# Patient Record
Sex: MALE | Race: WHITE | NOT HISPANIC OR LATINO | URBAN - METROPOLITAN AREA
[De-identification: names, ages, dates, MRNs, and addresses within clinical notes are randomized per-mention and may not be internally consistent; named-entity substitution may affect disease eponyms.]

---

## 2017-12-25 VITALS
HEART RATE: 110 BPM | SYSTOLIC BLOOD PRESSURE: 144 MMHG | RESPIRATION RATE: 20 BRPM | TEMPERATURE: 98 F | OXYGEN SATURATION: 100 % | DIASTOLIC BLOOD PRESSURE: 87 MMHG

## 2017-12-25 LAB
ALBUMIN SERPL ELPH-MCNC: 5 G/DL — SIGNIFICANT CHANGE UP (ref 3.3–5)
ALP SERPL-CCNC: 75 U/L — SIGNIFICANT CHANGE UP (ref 40–120)
ALT FLD-CCNC: 20 U/L — SIGNIFICANT CHANGE UP (ref 10–45)
ANION GAP SERPL CALC-SCNC: 23 MMOL/L — HIGH (ref 5–17)
APTT BLD: 27.3 SEC — LOW (ref 27.5–37.4)
AST SERPL-CCNC: 27 U/L — SIGNIFICANT CHANGE UP (ref 10–40)
B-OH-BUTYR SERPL-SCNC: 1.9 MMOL/L — HIGH
BASE EXCESS BLDV CALC-SCNC: -0.8 MMOL/L — SIGNIFICANT CHANGE UP
BASOPHILS NFR BLD AUTO: 0.1 % — SIGNIFICANT CHANGE UP (ref 0–2)
BILIRUB SERPL-MCNC: 0.8 MG/DL — SIGNIFICANT CHANGE UP (ref 0.2–1.2)
BUN SERPL-MCNC: 11 MG/DL — SIGNIFICANT CHANGE UP (ref 7–23)
CALCIUM SERPL-MCNC: 9.6 MG/DL — SIGNIFICANT CHANGE UP (ref 8.4–10.5)
CHLORIDE SERPL-SCNC: 96 MMOL/L — SIGNIFICANT CHANGE UP (ref 96–108)
CO2 SERPL-SCNC: 19 MMOL/L — LOW (ref 22–31)
CREAT SERPL-MCNC: 1.02 MG/DL — SIGNIFICANT CHANGE UP (ref 0.5–1.3)
EOSINOPHIL NFR BLD AUTO: 0.2 % — SIGNIFICANT CHANGE UP (ref 0–6)
GAS PNL BLDV: SIGNIFICANT CHANGE UP
GLUCOSE SERPL-MCNC: 127 MG/DL — HIGH (ref 70–99)
HCO3 BLDV-SCNC: 24 MMOL/L — SIGNIFICANT CHANGE UP (ref 20–27)
HCT VFR BLD CALC: 44.1 % — SIGNIFICANT CHANGE UP (ref 39–50)
HGB BLD-MCNC: 15.6 G/DL — SIGNIFICANT CHANGE UP (ref 13–17)
INR BLD: 1.03 — SIGNIFICANT CHANGE UP (ref 0.88–1.16)
LACTATE SERPL-SCNC: 1 MMOL/L — SIGNIFICANT CHANGE UP (ref 0.5–2)
LIDOCAIN IGE QN: 14 U/L — SIGNIFICANT CHANGE UP (ref 7–60)
LYMPHOCYTES # BLD AUTO: 10.8 % — LOW (ref 13–44)
MCHC RBC-ENTMCNC: 29.8 PG — SIGNIFICANT CHANGE UP (ref 27–34)
MCHC RBC-ENTMCNC: 35.4 G/DL — SIGNIFICANT CHANGE UP (ref 32–36)
MCV RBC AUTO: 84.3 FL — SIGNIFICANT CHANGE UP (ref 80–100)
MONOCYTES NFR BLD AUTO: 7.3 % — SIGNIFICANT CHANGE UP (ref 2–14)
NEUTROPHILS NFR BLD AUTO: 81.6 % — HIGH (ref 43–77)
PCO2 BLDV: 40 MMHG — LOW (ref 41–51)
PH BLDV: 7.4 — SIGNIFICANT CHANGE UP (ref 7.32–7.43)
PLATELET # BLD AUTO: 219 K/UL — SIGNIFICANT CHANGE UP (ref 150–400)
PO2 BLDV: 45 MMHG — SIGNIFICANT CHANGE UP
POTASSIUM SERPL-MCNC: 3.5 MMOL/L — SIGNIFICANT CHANGE UP (ref 3.5–5.3)
POTASSIUM SERPL-SCNC: 3.5 MMOL/L — SIGNIFICANT CHANGE UP (ref 3.5–5.3)
PROT SERPL-MCNC: 8.4 G/DL — HIGH (ref 6–8.3)
PROTHROM AB SERPL-ACNC: 11.4 SEC — SIGNIFICANT CHANGE UP (ref 9.8–12.7)
RBC # BLD: 5.23 M/UL — SIGNIFICANT CHANGE UP (ref 4.2–5.8)
RBC # FLD: 11.4 % — SIGNIFICANT CHANGE UP (ref 10.3–16.9)
SAO2 % BLDV: 78 % — SIGNIFICANT CHANGE UP
SODIUM SERPL-SCNC: 138 MMOL/L — SIGNIFICANT CHANGE UP (ref 135–145)
WBC # BLD: 15.3 K/UL — HIGH (ref 3.8–10.5)
WBC # FLD AUTO: 15.3 K/UL — HIGH (ref 3.8–10.5)

## 2017-12-25 PROCEDURE — 93010 ELECTROCARDIOGRAM REPORT: CPT

## 2017-12-25 PROCEDURE — 99284 EMERGENCY DEPT VISIT MOD MDM: CPT | Mod: 25

## 2017-12-25 RX ORDER — DIPHENHYDRAMINE HCL 50 MG
25 CAPSULE ORAL ONCE
Qty: 0 | Refills: 0 | Status: COMPLETED | OUTPATIENT
Start: 2017-12-25 | End: 2017-12-25

## 2017-12-25 RX ORDER — SODIUM CHLORIDE 9 MG/ML
1000 INJECTION INTRAMUSCULAR; INTRAVENOUS; SUBCUTANEOUS ONCE
Qty: 0 | Refills: 0 | Status: COMPLETED | OUTPATIENT
Start: 2017-12-25 | End: 2017-12-25

## 2017-12-25 RX ORDER — SODIUM CHLORIDE 9 MG/ML
2000 INJECTION INTRAMUSCULAR; INTRAVENOUS; SUBCUTANEOUS ONCE
Qty: 0 | Refills: 0 | Status: COMPLETED | OUTPATIENT
Start: 2017-12-25 | End: 2017-12-25

## 2017-12-25 RX ORDER — ONDANSETRON 8 MG/1
4 TABLET, FILM COATED ORAL ONCE
Qty: 0 | Refills: 0 | Status: COMPLETED | OUTPATIENT
Start: 2017-12-25 | End: 2017-12-25

## 2017-12-25 RX ORDER — SODIUM CHLORIDE 9 MG/ML
1000 INJECTION, SOLUTION INTRAVENOUS
Qty: 0 | Refills: 0 | Status: DISCONTINUED | OUTPATIENT
Start: 2017-12-25 | End: 2017-12-26

## 2017-12-25 RX ORDER — FAMOTIDINE 10 MG/ML
20 INJECTION INTRAVENOUS ONCE
Qty: 0 | Refills: 0 | Status: COMPLETED | OUTPATIENT
Start: 2017-12-25 | End: 2017-12-25

## 2017-12-25 RX ORDER — METOCLOPRAMIDE HCL 10 MG
10 TABLET ORAL ONCE
Qty: 0 | Refills: 0 | Status: COMPLETED | OUTPATIENT
Start: 2017-12-25 | End: 2017-12-25

## 2017-12-25 RX ORDER — SODIUM CHLORIDE 9 MG/ML
3 INJECTION INTRAMUSCULAR; INTRAVENOUS; SUBCUTANEOUS ONCE
Qty: 0 | Refills: 0 | Status: COMPLETED | OUTPATIENT
Start: 2017-12-25 | End: 2017-12-25

## 2017-12-25 RX ADMIN — Medication 104 MILLIGRAM(S): at 20:50

## 2017-12-25 RX ADMIN — ONDANSETRON 4 MILLIGRAM(S): 8 TABLET, FILM COATED ORAL at 20:50

## 2017-12-25 RX ADMIN — SODIUM CHLORIDE 3 MILLILITER(S): 9 INJECTION INTRAMUSCULAR; INTRAVENOUS; SUBCUTANEOUS at 20:40

## 2017-12-25 RX ADMIN — Medication 1 MILLIGRAM(S): at 21:27

## 2017-12-25 RX ADMIN — SODIUM CHLORIDE 2000 MILLILITER(S): 9 INJECTION INTRAMUSCULAR; INTRAVENOUS; SUBCUTANEOUS at 22:13

## 2017-12-25 RX ADMIN — Medication 25 MILLIGRAM(S): at 20:55

## 2017-12-25 RX ADMIN — SODIUM CHLORIDE 100 MILLILITER(S): 9 INJECTION, SOLUTION INTRAVENOUS at 23:04

## 2017-12-25 RX ADMIN — SODIUM CHLORIDE 1000 MILLILITER(S): 9 INJECTION INTRAMUSCULAR; INTRAVENOUS; SUBCUTANEOUS at 20:50

## 2017-12-25 RX ADMIN — FAMOTIDINE 20 MILLIGRAM(S): 10 INJECTION INTRAVENOUS at 20:50

## 2017-12-25 RX ADMIN — ONDANSETRON 4 MILLIGRAM(S): 8 TABLET, FILM COATED ORAL at 21:27

## 2017-12-25 NOTE — ED PROVIDER NOTE - PROGRESS NOTE DETAILS
Pt requiring IV reglan in addition to Zofran due to continuous retching, vomiting. Pt requiring IV Ativan as well for nausea, continued retching. pt's labs notable for elevated anion gap, low CO2. will hydrate and repeat bmp Pt states he feels better, will repeat bmp and attempt po chall pt with persistent nausea and pain will obtain US of RUQ and also give zofran. pt's us neg acute, plan for xr although doubt sbo with epigastric pain only and pt with + bm, passing gas + loose stool only 1 surg.

## 2017-12-25 NOTE — ED ADULT NURSE NOTE - OBJECTIVE STATEMENT
pt with vomiting for 5 days, abdominal discomfort, unable to drink or eat, had same episode 2 years ago

## 2017-12-25 NOTE — ED PROVIDER NOTE - OBJECTIVE STATEMENT
27M from [place] with hx of cyclic vomiting presenting with weakness nausea for two weeks, multiple episodes of nbnb vomiting today (more than 10 as per patient), pt also with multiple episodes of nonbloody, no black diarrhea. No recent travel other than from [place], no recent abx use. Pt has hx of appendectomy. Hx of multiple w/u with endoscopy, colonoscopy, ultrasound w/o etiology. Pt with epigastric pain.

## 2017-12-25 NOTE — ED PROVIDER NOTE - MEDICAL DECISION MAKING DETAILS
27M with hx of nausea, vomiting, diarrhea. 2 weeks of intermittent symptoms, now worsening, epigastric pain only. No fever. Plan for Labs, IV fluids, reglan/zofran and reassess. Given epigastric pain will check liver labs and lipase. NO indication for acute imaging at this time doubt cholecystitis given that pt has had similar complaint before with resolution of sx. Will continue serial abd exam.

## 2017-12-26 ENCOUNTER — INPATIENT (INPATIENT)
Facility: HOSPITAL | Age: 27
LOS: 4 days | Discharge: HOME | DRG: 897 | End: 2017-12-31
Attending: STUDENT IN AN ORGANIZED HEALTH CARE EDUCATION/TRAINING PROGRAM | Admitting: STUDENT IN AN ORGANIZED HEALTH CARE EDUCATION/TRAINING PROGRAM
Payer: COMMERCIAL

## 2017-12-26 DIAGNOSIS — Z29.9 ENCOUNTER FOR PROPHYLACTIC MEASURES, UNSPECIFIED: ICD-10-CM

## 2017-12-26 DIAGNOSIS — Z90.49 ACQUIRED ABSENCE OF OTHER SPECIFIED PARTS OF DIGESTIVE TRACT: Chronic | ICD-10-CM

## 2017-12-26 DIAGNOSIS — E87.2 ACIDOSIS: ICD-10-CM

## 2017-12-26 DIAGNOSIS — R19.7 DIARRHEA, UNSPECIFIED: ICD-10-CM

## 2017-12-26 DIAGNOSIS — D72.829 ELEVATED WHITE BLOOD CELL COUNT, UNSPECIFIED: ICD-10-CM

## 2017-12-26 DIAGNOSIS — R11.10 VOMITING, UNSPECIFIED: ICD-10-CM

## 2017-12-26 LAB
ANION GAP SERPL CALC-SCNC: 13 MMOL/L — SIGNIFICANT CHANGE UP (ref 5–17)
ANION GAP SERPL CALC-SCNC: 14 MMOL/L — SIGNIFICANT CHANGE UP (ref 5–17)
ANION GAP SERPL CALC-SCNC: 16 MMOL/L — SIGNIFICANT CHANGE UP (ref 5–17)
APPEARANCE UR: CLEAR — SIGNIFICANT CHANGE UP
B-OH-BUTYR SERPL-SCNC: 1.9 MMOL/L — HIGH
BILIRUB UR-MCNC: NEGATIVE — SIGNIFICANT CHANGE UP
BUN SERPL-MCNC: 4 MG/DL — LOW (ref 7–23)
BUN SERPL-MCNC: 5 MG/DL — LOW (ref 7–23)
BUN SERPL-MCNC: 9 MG/DL — SIGNIFICANT CHANGE UP (ref 7–23)
CALCIUM SERPL-MCNC: 8.1 MG/DL — LOW (ref 8.4–10.5)
CALCIUM SERPL-MCNC: 8.6 MG/DL — SIGNIFICANT CHANGE UP (ref 8.4–10.5)
CALCIUM SERPL-MCNC: 9.2 MG/DL — SIGNIFICANT CHANGE UP (ref 8.4–10.5)
CHLORIDE SERPL-SCNC: 100 MMOL/L — SIGNIFICANT CHANGE UP (ref 96–108)
CHLORIDE SERPL-SCNC: 103 MMOL/L — SIGNIFICANT CHANGE UP (ref 96–108)
CHLORIDE SERPL-SCNC: 99 MMOL/L — SIGNIFICANT CHANGE UP (ref 96–108)
CO2 SERPL-SCNC: 22 MMOL/L — SIGNIFICANT CHANGE UP (ref 22–31)
CO2 SERPL-SCNC: 23 MMOL/L — SIGNIFICANT CHANGE UP (ref 22–31)
CO2 SERPL-SCNC: 23 MMOL/L — SIGNIFICANT CHANGE UP (ref 22–31)
COLOR SPEC: YELLOW — SIGNIFICANT CHANGE UP
CREAT SERPL-MCNC: 0.82 MG/DL — SIGNIFICANT CHANGE UP (ref 0.5–1.3)
CREAT SERPL-MCNC: 0.88 MG/DL — SIGNIFICANT CHANGE UP (ref 0.5–1.3)
CREAT SERPL-MCNC: 0.93 MG/DL — SIGNIFICANT CHANGE UP (ref 0.5–1.3)
DIFF PNL FLD: (no result)
GLUCOSE SERPL-MCNC: 108 MG/DL — HIGH (ref 70–99)
GLUCOSE SERPL-MCNC: 126 MG/DL — HIGH (ref 70–99)
GLUCOSE SERPL-MCNC: 227 MG/DL — HIGH (ref 70–99)
GLUCOSE UR QL: NEGATIVE — SIGNIFICANT CHANGE UP
HBA1C BLD-MCNC: 5.3 % — SIGNIFICANT CHANGE UP (ref 4–5.6)
HCT VFR BLD CALC: 40.6 % — SIGNIFICANT CHANGE UP (ref 39–50)
HGB BLD-MCNC: 14.2 G/DL — SIGNIFICANT CHANGE UP (ref 13–17)
KETONES UR-MCNC: >=80 MG/DL
LEUKOCYTE ESTERASE UR-ACNC: NEGATIVE — SIGNIFICANT CHANGE UP
MCHC RBC-ENTMCNC: 29.8 PG — SIGNIFICANT CHANGE UP (ref 27–34)
MCHC RBC-ENTMCNC: 35 G/DL — SIGNIFICANT CHANGE UP (ref 32–36)
MCV RBC AUTO: 85.1 FL — SIGNIFICANT CHANGE UP (ref 80–100)
NITRITE UR-MCNC: NEGATIVE — SIGNIFICANT CHANGE UP
OSMOLALITY SERPL: 294 MOSM/KG — SIGNIFICANT CHANGE UP (ref 280–301)
PH UR: 6 — SIGNIFICANT CHANGE UP (ref 5–8)
PLATELET # BLD AUTO: 174 K/UL — SIGNIFICANT CHANGE UP (ref 150–400)
POTASSIUM SERPL-MCNC: 3.4 MMOL/L — LOW (ref 3.5–5.3)
POTASSIUM SERPL-MCNC: 3.8 MMOL/L — SIGNIFICANT CHANGE UP (ref 3.5–5.3)
POTASSIUM SERPL-MCNC: 4 MMOL/L — SIGNIFICANT CHANGE UP (ref 3.5–5.3)
POTASSIUM SERPL-SCNC: 3.4 MMOL/L — LOW (ref 3.5–5.3)
POTASSIUM SERPL-SCNC: 3.8 MMOL/L — SIGNIFICANT CHANGE UP (ref 3.5–5.3)
POTASSIUM SERPL-SCNC: 4 MMOL/L — SIGNIFICANT CHANGE UP (ref 3.5–5.3)
PROT UR-MCNC: NEGATIVE MG/DL — SIGNIFICANT CHANGE UP
RBC # BLD: 4.77 M/UL — SIGNIFICANT CHANGE UP (ref 4.2–5.8)
RBC # FLD: 11.7 % — SIGNIFICANT CHANGE UP (ref 10.3–16.9)
SODIUM SERPL-SCNC: 136 MMOL/L — SIGNIFICANT CHANGE UP (ref 135–145)
SODIUM SERPL-SCNC: 138 MMOL/L — SIGNIFICANT CHANGE UP (ref 135–145)
SODIUM SERPL-SCNC: 139 MMOL/L — SIGNIFICANT CHANGE UP (ref 135–145)
SP GR SPEC: 1.02 — SIGNIFICANT CHANGE UP (ref 1–1.03)
UROBILINOGEN FLD QL: 0.2 E.U./DL — SIGNIFICANT CHANGE UP
WBC # BLD: 10.3 K/UL — SIGNIFICANT CHANGE UP (ref 3.8–10.5)
WBC # FLD AUTO: 10.3 K/UL — SIGNIFICANT CHANGE UP (ref 3.8–10.5)

## 2017-12-26 PROCEDURE — 71020: CPT | Mod: 26

## 2017-12-26 PROCEDURE — 93010 ELECTROCARDIOGRAM REPORT: CPT | Mod: 77

## 2017-12-26 PROCEDURE — 74020: CPT | Mod: 26

## 2017-12-26 PROCEDURE — 99223 1ST HOSP IP/OBS HIGH 75: CPT | Mod: GC

## 2017-12-26 PROCEDURE — 12345: CPT | Mod: GC,NC

## 2017-12-26 PROCEDURE — 76705 ECHO EXAM OF ABDOMEN: CPT | Mod: 26

## 2017-12-26 PROCEDURE — 93010 ELECTROCARDIOGRAM REPORT: CPT

## 2017-12-26 RX ORDER — SODIUM CHLORIDE 9 MG/ML
1000 INJECTION INTRAMUSCULAR; INTRAVENOUS; SUBCUTANEOUS
Qty: 0 | Refills: 0 | Status: DISCONTINUED | OUTPATIENT
Start: 2017-12-26 | End: 2017-12-26

## 2017-12-26 RX ORDER — MORPHINE SULFATE 50 MG/1
2 CAPSULE, EXTENDED RELEASE ORAL ONCE
Qty: 0 | Refills: 0 | Status: DISCONTINUED | OUTPATIENT
Start: 2017-12-26 | End: 2017-12-26

## 2017-12-26 RX ORDER — ONDANSETRON 8 MG/1
4 TABLET, FILM COATED ORAL EVERY 6 HOURS
Qty: 0 | Refills: 0 | Status: COMPLETED | OUTPATIENT
Start: 2017-12-26 | End: 2017-12-26

## 2017-12-26 RX ORDER — POTASSIUM CHLORIDE 20 MEQ
40 PACKET (EA) ORAL ONCE
Qty: 0 | Refills: 0 | Status: COMPLETED | OUTPATIENT
Start: 2017-12-26 | End: 2017-12-26

## 2017-12-26 RX ORDER — METOCLOPRAMIDE HCL 10 MG
10 TABLET ORAL ONCE
Qty: 0 | Refills: 0 | Status: COMPLETED | OUTPATIENT
Start: 2017-12-26 | End: 2017-12-26

## 2017-12-26 RX ORDER — ONDANSETRON 8 MG/1
4 TABLET, FILM COATED ORAL EVERY 4 HOURS
Qty: 0 | Refills: 0 | Status: DISCONTINUED | OUTPATIENT
Start: 2017-12-26 | End: 2017-12-26

## 2017-12-26 RX ORDER — POTASSIUM CHLORIDE 20 MEQ
20 PACKET (EA) ORAL ONCE
Qty: 0 | Refills: 0 | Status: DISCONTINUED | OUTPATIENT
Start: 2017-12-26 | End: 2017-12-26

## 2017-12-26 RX ORDER — ONDANSETRON 8 MG/1
4 TABLET, FILM COATED ORAL ONCE
Qty: 0 | Refills: 0 | Status: COMPLETED | OUTPATIENT
Start: 2017-12-26 | End: 2017-12-26

## 2017-12-26 RX ORDER — ACETAMINOPHEN 500 MG
1000 TABLET ORAL ONCE
Qty: 0 | Refills: 0 | Status: COMPLETED | OUTPATIENT
Start: 2017-12-26 | End: 2017-12-26

## 2017-12-26 RX ORDER — FAMOTIDINE 10 MG/ML
20 INJECTION INTRAVENOUS DAILY
Qty: 0 | Refills: 0 | Status: DISCONTINUED | OUTPATIENT
Start: 2017-12-26 | End: 2017-12-26

## 2017-12-26 RX ORDER — SODIUM CHLORIDE 9 MG/ML
1000 INJECTION, SOLUTION INTRAVENOUS ONCE
Qty: 0 | Refills: 0 | Status: COMPLETED | OUTPATIENT
Start: 2017-12-26 | End: 2017-12-26

## 2017-12-26 RX ORDER — LANOLIN ALCOHOL/MO/W.PET/CERES
3 CREAM (GRAM) TOPICAL AT BEDTIME
Qty: 0 | Refills: 0 | Status: DISCONTINUED | OUTPATIENT
Start: 2017-12-26 | End: 2017-12-27

## 2017-12-26 RX ORDER — FINASTERIDE 5 MG/1
0 TABLET, FILM COATED ORAL
Qty: 0 | Refills: 0 | COMMUNITY

## 2017-12-26 RX ORDER — POTASSIUM CHLORIDE 20 MEQ
20 PACKET (EA) ORAL ONCE
Qty: 0 | Refills: 0 | Status: COMPLETED | OUTPATIENT
Start: 2017-12-26 | End: 2017-12-26

## 2017-12-26 RX ORDER — PANTOPRAZOLE SODIUM 20 MG/1
40 TABLET, DELAYED RELEASE ORAL DAILY
Qty: 0 | Refills: 0 | Status: DISCONTINUED | OUTPATIENT
Start: 2017-12-26 | End: 2017-12-31

## 2017-12-26 RX ORDER — METOCLOPRAMIDE HCL 10 MG
10 TABLET ORAL EVERY 8 HOURS
Qty: 0 | Refills: 0 | Status: DISCONTINUED | OUTPATIENT
Start: 2017-12-26 | End: 2017-12-28

## 2017-12-26 RX ORDER — SODIUM CHLORIDE 9 MG/ML
1000 INJECTION, SOLUTION INTRAVENOUS
Qty: 0 | Refills: 0 | Status: DISCONTINUED | OUTPATIENT
Start: 2017-12-26 | End: 2017-12-31

## 2017-12-26 RX ORDER — METOCLOPRAMIDE HCL 10 MG
10 TABLET ORAL EVERY 8 HOURS
Qty: 0 | Refills: 0 | Status: DISCONTINUED | OUTPATIENT
Start: 2017-12-26 | End: 2017-12-26

## 2017-12-26 RX ORDER — LANOLIN ALCOHOL/MO/W.PET/CERES
5 CREAM (GRAM) TOPICAL AT BEDTIME
Qty: 0 | Refills: 0 | Status: DISCONTINUED | OUTPATIENT
Start: 2017-12-26 | End: 2017-12-26

## 2017-12-26 RX ADMIN — Medication 1 MILLIGRAM(S): at 01:00

## 2017-12-26 RX ADMIN — MORPHINE SULFATE 2 MILLIGRAM(S): 50 CAPSULE, EXTENDED RELEASE ORAL at 07:10

## 2017-12-26 RX ADMIN — Medication 20 MILLIEQUIVALENT(S): at 09:49

## 2017-12-26 RX ADMIN — SODIUM CHLORIDE 120 MILLILITER(S): 9 INJECTION, SOLUTION INTRAVENOUS at 16:53

## 2017-12-26 RX ADMIN — Medication 10 MILLIGRAM(S): at 09:50

## 2017-12-26 RX ADMIN — SODIUM CHLORIDE 100 MILLILITER(S): 9 INJECTION INTRAMUSCULAR; INTRAVENOUS; SUBCUTANEOUS at 14:42

## 2017-12-26 RX ADMIN — Medication 30 MILLILITER(S): at 03:48

## 2017-12-26 RX ADMIN — ONDANSETRON 4 MILLIGRAM(S): 8 TABLET, FILM COATED ORAL at 05:04

## 2017-12-26 RX ADMIN — Medication 104 MILLIGRAM(S): at 02:50

## 2017-12-26 RX ADMIN — SODIUM CHLORIDE 120 MILLILITER(S): 9 INJECTION, SOLUTION INTRAVENOUS at 22:22

## 2017-12-26 RX ADMIN — SODIUM CHLORIDE 1000 MILLILITER(S): 9 INJECTION, SOLUTION INTRAVENOUS at 02:05

## 2017-12-26 RX ADMIN — SODIUM CHLORIDE 100 MILLILITER(S): 9 INJECTION INTRAMUSCULAR; INTRAVENOUS; SUBCUTANEOUS at 06:22

## 2017-12-26 RX ADMIN — Medication 10 MILLIGRAM(S): at 23:08

## 2017-12-26 RX ADMIN — Medication 3 MILLIGRAM(S): at 22:22

## 2017-12-26 RX ADMIN — ONDANSETRON 4 MILLIGRAM(S): 8 TABLET, FILM COATED ORAL at 16:54

## 2017-12-26 RX ADMIN — Medication 400 MILLIGRAM(S): at 14:39

## 2017-12-26 RX ADMIN — PANTOPRAZOLE SODIUM 40 MILLIGRAM(S): 20 TABLET, DELAYED RELEASE ORAL at 16:54

## 2017-12-26 RX ADMIN — ONDANSETRON 4 MILLIGRAM(S): 8 TABLET, FILM COATED ORAL at 00:52

## 2017-12-26 RX ADMIN — Medication 1000 MILLIGRAM(S): at 15:10

## 2017-12-26 RX ADMIN — MORPHINE SULFATE 2 MILLIGRAM(S): 50 CAPSULE, EXTENDED RELEASE ORAL at 07:08

## 2017-12-26 RX ADMIN — ONDANSETRON 4 MILLIGRAM(S): 8 TABLET, FILM COATED ORAL at 12:25

## 2017-12-26 NOTE — H&P ADULT - NSHPREVIEWOFSYSTEMS_GEN_ALL_CORE
CONSTITUTIONAL: No weakness, fevers or chills  EYES/ENT: No visual changes;  No vertigo or throat pain   NECK: No pain or stiffness  RESPIRATORY: No cough, wheezing, hemoptysis; No shortness of breath  CARDIOVASCULAR: No chest pain or palpitations  GASTROINTESTINAL: see hpi  GENITOURINARY: No dysuria, frequency or hematuria  NEUROLOGICAL: No numbness or weakness  SKIN: No itching, burning, rashes, or lesions   All other review of systems is negative unless indicated above.

## 2017-12-26 NOTE — DISCHARGE NOTE ADULT - HOSPITAL COURSE
Patient is a 27 year old male with history of daily marijuana use and multiple hospitalizations for intractable vomiting (10/2016, 11/2016, 12/2016 at [facility] and a hospital in [place]) presents with vomiting x 3 days after starting to smoke marijuana after a brief hiatus. Patient was admitted to the hospital for IV hydration and further workup. Patient has been on a regimen of zofran, reglan (with Qtc wnl: 460's-420's) and has continued to wretch. Records were obtained from his stay at [facility] (currently the first documents in front of his paper chart) and pt's sister is trying to obtain documents from hospital in [place] where patient was hospitalized last year and underwent EGD/colonoscopy with bowel biopsies (normal results per family). Patient currently on maintenance fluids with diet of clears. Electrolytes were repleted as needed. Patient's diet was advanced and given patient stability, patient was deemed safe for discharge with close outpatient follow up with his primary care physician [place]. Patient is a 27 year old male with history of daily marijuana use and multiple hospitalizations for intractable vomiting (10/2016, 11/2016, 12/2016 at [facility] and a hospital in [place]) presents with vomiting x 3 days after starting to smoke marijuana after a brief hiatus. Patient was admitted to the hospital for IV hydration and further workup. Patient started initially on a regimen of zofran, reglan (with Qtc wnl: 460's-420's) with minimal relief of symptoms Records were obtained from his stay at [facility] (currently the first documents in front of his paper chart).  Patient medication regimen switched to phenergan, zofran, and ativan with relief of symptoms.  Patient diet was advanced.  Patient was deemed safe for discharge with close outpatient follow up with your primary care provider abroad. Patient is a 27 year old male with history of daily marijuana use and multiple hospitalizations for intractable vomiting (10/2016, 11/2016, 12/2016 at [facility] and a hospital in [place]) presents with vomiting x 3 days after starting to smoke marijuana after a brief hiatus. Patient was admitted to the hospital for IV hydration and further workup. Patient started initially on a regimen of zofran, reglan (with Qtc wnl: 460's-420's) with minimal relief of symptoms.  Patient medication regimen switched to phenergan, zofran, and ativan with relief of symptoms. he has had egd/colonoscopy, which was unremarkable as per patient.   Patient diet was advanced.  Patient was deemed safe for discharge with close outpatient follow up with your primary care provider abroad.

## 2017-12-26 NOTE — PROGRESS NOTE ADULT - ASSESSMENT
Patient is a 27 year old male with no significant PMHx who presents with vomiting and diarrhea Patient is a 27 year old male with no significant PMHx who presents with vomiting in the context of daily marijuana use.

## 2017-12-26 NOTE — H&P ADULT - HISTORY OF PRESENT ILLNESS
Patient is a 27 year old male with no PMHx who presents with one week of vomiting. Patient reports having a similar episode of intractable vomiting when he was in [place] last year and was hospitalized for four days after a negative work up. One month later in [facility], he again had intractable vomiting and was admitted for five days, this time with diarrhea. He reports having a normal endoscopy and colonoscopy with biopsies during that admission. Since January, he has had several episodes of vomiting that do not appear to be associated with anything he eats, drinks or changes in activity. His diarrhea has persisted since January as well, but has become more frequent the last two weeks. The diarrhea is normal colored but watery and soft and now occurs about 3 times a day. His symptoms are associated with a "stomach twisting" sensation immediately after he eats and an occassional cramping in his lower abdomen. Social history is notable for daily marijuana use (one joint), which he sometimes uses to manage his nausea. Patient says he took a three week hiatus from marijuana when he was traveling and had no GI symptoms during that time. He denies alcohol use or other drug use. Family history is remarkable for a great-aunt who may have had stomach cancer. His only medication is for alopecia and denies OTC herbs or supplements.    VS in the ED: T98.1  /87 RR 20 O2 100 on RA. He was given reglan, zofran, pepcid, maalox, ativan and IVF

## 2017-12-26 NOTE — DISCHARGE NOTE ADULT - PATIENT PORTAL LINK FT
“You can access the Follow[facility] Patient Portal, offered by [facility], by registering with the following website: [email or web address]”

## 2017-12-26 NOTE — PROGRESS NOTE ADULT - SUBJECTIVE AND OBJECTIVE BOX
OVERNIGHT EVENTS: No overnight events. Patient continues to wretch.     SUBJECTIVE / INTERVAL HPI: Patient seen and examined at bedside. Patient continues to wretch bringing up some clear mucus. Complains of ongoing abdominal discomfort and nausea.     VITAL SIGNS:  Vital Signs Last 24 Hrs  T(C): 36.9 (26 Dec 2017 07:50), Max: 37.4 (26 Dec 2017 01:01)  T(F): 98.5 (26 Dec 2017 07:50), Max: 99.3 (26 Dec 2017 01:01)  HR: 104 (26 Dec 2017 07:50) (97 - 110)  BP: 153/90 (26 Dec 2017 07:50) (134/88 - 153/90)  BP(mean): --  RR: 16 (26 Dec 2017 07:50) (16 - 20)  SpO2: 98% (26 Dec 2017 07:50) (97% - 100%)    PHYSICAL EXAM:    General: WDWN, no acute distress but looking uncomfortable, nondiaphoretic  HEENT: NC/AT; PERRL, anicteric sclera; MMM  Neck: supple  Cardiovascular: +S1/S2; RRR  Respiratory: CTA B/L; no W/R/R  Gastrointestinal: soft, NT/ND; +BSx4  Extremities: WWP; no edema, clubbing or cyanosis  Vascular: 2+ radial, DP/PT pulses B/L  Neurological: AAOx3; no focal deficits    MEDICATIONS:  MEDICATIONS  (STANDING):  famotidine    Tablet 20 milliGRAM(s) Oral daily  melatonin 5 milliGRAM(s) Oral at bedtime  metoclopramide Injectable 10 milliGRAM(s) IV Push once  sodium chloride 0.9%. 1000 milliLiter(s) (100 mL/Hr) IV Continuous <Continuous>    MEDICATIONS  (PRN):  aluminum hydroxide/magnesium hydroxide/simethicone Suspension 30 milliLiter(s) Oral every 4 hours PRN Dyspepsia  ondansetron Injectable 4 milliGRAM(s) IV Push every 4 hours PRN Nausea and/or Vomiting      ALLERGIES:  Allergies    No Known Allergies    Intolerances        LABS:                        15.6   15.3  )-----------( 219      ( 25 Dec 2017 20:47 )             44.1     12-26    136  |  100  |  5<L>  ----------------------------<  126<H>  3.4<L>   |  23  |  0.88    Ca    8.6      26 Dec 2017 07:24    TPro  8.4<H>  /  Alb  5.0  /  TBili  0.8  /  DBili  x   /  AST  27  /  ALT  20  /  AlkPhos  75  12-25    PT/INR - ( 25 Dec 2017 20:47 )   PT: 11.4 sec;   INR: 1.03          PTT - ( 25 Dec 2017 20:47 )  PTT:27.3 sec  Urinalysis Basic - ( 26 Dec 2017 00:44 )    Color: Yellow / Appearance: Clear / S.025 / pH: x  Gluc: x / Ketone: >=80 mg/dL  / Bili: Negative / Urobili: 0.2 E.U./dL   Blood: x / Protein: NEGATIVE mg/dL / Nitrite: NEGATIVE   Leuk Esterase: NEGATIVE / RBC: < 5 /HPF / WBC < 5 /HPF   Sq Epi: x / Non Sq Epi: 0-5 /HPF / Bacteria: Present /HPF      CAPILLARY BLOOD GLUCOSE              RADIOLOGY & ADDITIONAL TESTS: Reviewed.      ASSESSMENT:    PLAN: OVERNIGHT EVENTS: No overnight events. Patient continues to wretch.     SUBJECTIVE / INTERVAL HPI: Patient seen and examined at bedside. Patient continues to wretch bringing up some clear mucus. Complains of ongoing abdominal discomfort and nausea.     VITAL SIGNS:  Vital Signs Last 24 Hrs  T(C): 36.9 (26 Dec 2017 07:50), Max: 37.4 (26 Dec 2017 01:01)  T(F): 98.5 (26 Dec 2017 07:50), Max: 99.3 (26 Dec 2017 01:01)  HR: 104 (26 Dec 2017 07:50) (97 - 110)  BP: 153/90 (26 Dec 2017 07:50) (134/88 - 153/90)  BP(mean): --  RR: 16 (26 Dec 2017 07:50) (16 - 20)  SpO2: 98% (26 Dec 2017 07:50) (97% - 100%)    PHYSICAL EXAM:    General: WDWN, no acute distress but looking uncomfortable, nondiaphoretic  HEENT: NC/AT; PERRL, anicteric sclera; MMM  Neck: supple  Cardiovascular: +S1/S2; fast heart rate, no murmurs/gallops/rubs  Respiratory: CTA B/L; no W/R/R  Gastrointestinal: soft, NT/ND; +BSx4, hypoactive bowel sounds  Extremities: WWP; no edema, clubbing or cyanosis  Vascular: 2+ radial, DP/PT pulses B/L  Neurological: AAOx3; no focal deficits    MEDICATIONS:  MEDICATIONS  (STANDING):  famotidine    Tablet 20 milliGRAM(s) Oral daily  melatonin 5 milliGRAM(s) Oral at bedtime  metoclopramide Injectable 10 milliGRAM(s) IV Push once  sodium chloride 0.9%. 1000 milliLiter(s) (100 mL/Hr) IV Continuous <Continuous>    MEDICATIONS  (PRN):  aluminum hydroxide/magnesium hydroxide/simethicone Suspension 30 milliLiter(s) Oral every 4 hours PRN Dyspepsia  ondansetron Injectable 4 milliGRAM(s) IV Push every 4 hours PRN Nausea and/or Vomiting      ALLERGIES:  Allergies    No Known Allergies    Intolerances        LABS:                        15.6   15.3  )-----------( 219      ( 25 Dec 2017 20:47 )             44.1     12-    136  |  100  |  5<L>  ----------------------------<  126<H>  3.4<L>   |  23  |  0.88    Ca    8.6      26 Dec 2017 07:24    TPro  8.4<H>  /  Alb  5.0  /  TBili  0.8  /  DBili  x   /  AST  27  /  ALT  20  /  AlkPhos  75  12-25    PT/INR - ( 25 Dec 2017 20:47 )   PT: 11.4 sec;   INR: 1.03          PTT - ( 25 Dec 2017 20:47 )  PTT:27.3 sec  Urinalysis Basic - ( 26 Dec 2017 00:44 )    Color: Yellow / Appearance: Clear / S.025 / pH: x  Gluc: x / Ketone: >=80 mg/dL  / Bili: Negative / Urobili: 0.2 E.U./dL   Blood: x / Protein: NEGATIVE mg/dL / Nitrite: NEGATIVE   Leuk Esterase: NEGATIVE / RBC: < 5 /HPF / WBC < 5 /HPF   Sq Epi: x / Non Sq Epi: 0-5 /HPF / Bacteria: Present /HPF      CAPILLARY BLOOD GLUCOSE              RADIOLOGY & ADDITIONAL TESTS: Reviewed. OVERNIGHT EVENTS: No overnight events. Patient continues to wretch.     SUBJECTIVE / INTERVAL HPI: Patient seen and examined at bedside. Patient continues to wretch bringing up some clear mucus. Complains of ongoing abdominal discomfort and nausea.     VITAL SIGNS:  Vital Signs Last 24 Hrs  T(C): 36.9 (26 Dec 2017 07:50), Max: 37.4 (26 Dec 2017 01:01)  T(F): 98.5 (26 Dec 2017 07:50), Max: 99.3 (26 Dec 2017 01:01)  HR: 104 (26 Dec 2017 07:50) (97 - 110)  BP: 153/90 (26 Dec 2017 07:50) (134/88 - 153/90)  BP(mean): --  RR: 16 (26 Dec 2017 07:50) (16 - 20)  SpO2: 98% (26 Dec 2017 07:50) (97% - 100%)    PHYSICAL EXAM:    General: WDWN, no acute distress but looking uncomfortable, nondiaphoretic  HEENT: NC/AT; PERRL, anicteric sclera; MMM  Neck: supple  Cardiovascular: +S1/S2; fast heart rate, no murmurs/gallops/rubs  Respiratory: CTA B/L; no W/R/R  Gastrointestinal: soft, NT/ND; +BSx4, hypoactive bowel sounds  Extremities: WWP; no edema, clubbing or cyanosis  Vascular: 2+ radial, DP/PT pulses B/L  Neurological: AAOx3; 5/5 str all 4 ext,  no focal deficits    MEDICATIONS:  MEDICATIONS  (STANDING):  famotidine    Tablet 20 milliGRAM(s) Oral daily  melatonin 5 milliGRAM(s) Oral at bedtime  metoclopramide Injectable 10 milliGRAM(s) IV Push once  sodium chloride 0.9%. 1000 milliLiter(s) (100 mL/Hr) IV Continuous <Continuous>    MEDICATIONS  (PRN):  aluminum hydroxide/magnesium hydroxide/simethicone Suspension 30 milliLiter(s) Oral every 4 hours PRN Dyspepsia  ondansetron Injectable 4 milliGRAM(s) IV Push every 4 hours PRN Nausea and/or Vomiting      ALLERGIES:  Allergies    No Known Allergies    Intolerances        LABS:                        15.6   15.3  )-----------( 219      ( 25 Dec 2017 20:47 )             44.1     12-    136  |  100  |  5<L>  ----------------------------<  126<H>  3.4<L>   |  23  |  0.88    Ca    8.6      26 Dec 2017 07:24    TPro  8.4<H>  /  Alb  5.0  /  TBili  0.8  /  DBili  x   /  AST  27  /  ALT  20  /  AlkPhos  75      PT/INR - ( 25 Dec 2017 20:47 )   PT: 11.4 sec;   INR: 1.03          PTT - ( 25 Dec 2017 20:47 )  PTT:27.3 sec  Urinalysis Basic - ( 26 Dec 2017 00:44 )    Color: Yellow / Appearance: Clear / S.025 / pH: x  Gluc: x / Ketone: >=80 mg/dL  / Bili: Negative / Urobili: 0.2 E.U./dL   Blood: x / Protein: NEGATIVE mg/dL / Nitrite: NEGATIVE   Leuk Esterase: NEGATIVE / RBC: < 5 /HPF / WBC < 5 /HPF   Sq Epi: x / Non Sq Epi: 0-5 /HPF / Bacteria: Present /HPF      CAPILLARY BLOOD GLUCOSE              RADIOLOGY & ADDITIONAL TESTS: Reviewed. HOSPITAL COURSE:    Patient is a 27 year old male with history of daily marijuana use     who presents with one week of vomiting. Patient reports having a similar episode of intractable vomiting when he was in [place] last year and was hospitalized for four days after a negative work up. One month later in \[facility]", he again had intractable vomiting and was admitted for five days, this time with diarrhea. He reports having a normal endoscopy and colonoscopy with biopsies during that admission. Since January, he has had several episodes of vomiting that do not appear to be associated with anything he eats, drinks or changes in activity. His diarrhea has persisted since January as well, but has become more frequent the last two weeks. The diarrhea is normal colored but watery and soft and now occurs about 3 times a day. His symptoms are associated with a "stomach twisting" sensation immediately after he eats and an occassional cramping in his lower abdomen. Social history is notable for daily marijuana use (one joint), which he sometimes uses to manage his nausea. Patient says he took a three week hiatus from marijuana when he was traveling and had no GI symptoms during that time. He denies alcohol use or other drug use. Family history is remarkable for a great-aunt who may have had stomach cancer. His only medication is for alopecia and denies OTC herbs or supplements.    VS in the ED: T98.1  /87 RR 20 O2 100 on RA. He was given reglan, zofran, pepcid, maalox, ativan and IVF          OVERNIGHT EVENTS: No overnight events. Patient continues to wretch.     SUBJECTIVE / INTERVAL HPI: Patient seen and examined at bedside. Patient continues to wretch bringing up some clear mucus. Complains of ongoing abdominal discomfort and nausea.     VITAL SIGNS:  Vital Signs Last 24 Hrs  T(C): 36.9 (26 Dec 2017 07:50), Max: 37.4 (26 Dec 2017 01:01)  T(F): 98.5 (26 Dec 2017 07:50), Max: 99.3 (26 Dec 2017 01:01)  HR: 104 (26 Dec 2017 07:50) (97 - 110)  BP: 153/90 (26 Dec 2017 07:50) (134/88 - 153/90)  BP(mean): --  RR: 16 (26 Dec 2017 07:50) (16 - 20)  SpO2: 98% (26 Dec 2017 07:50) (97% - 100%)    PHYSICAL EXAM:    General: WDWN, no acute distress but looking uncomfortable, nondiaphoretic  HEENT: NC/AT; PERRL, anicteric sclera; MMM  Neck: supple  Cardiovascular: +S1/S2; fast heart rate, no murmurs/gallops/rubs  Respiratory: CTA B/L; no W/R/R  Gastrointestinal: soft, NT/ND; +BSx4, hypoactive bowel sounds  Extremities: WWP; no edema, clubbing or cyanosis  Vascular: 2+ radial, DP/PT pulses B/L  Neurological: AAOx3; 5/5 str all 4 ext,  no focal deficits    MEDICATIONS:  MEDICATIONS  (STANDING):  famotidine    Tablet 20 milliGRAM(s) Oral daily  melatonin 5 milliGRAM(s) Oral at bedtime  metoclopramide Injectable 10 milliGRAM(s) IV Push once  sodium chloride 0.9%. 1000 milliLiter(s) (100 mL/Hr) IV Continuous <Continuous>    MEDICATIONS  (PRN):  aluminum hydroxide/magnesium hydroxide/simethicone Suspension 30 milliLiter(s) Oral every 4 hours PRN Dyspepsia  ondansetron Injectable 4 milliGRAM(s) IV Push every 4 hours PRN Nausea and/or Vomiting      ALLERGIES:  Allergies    No Known Allergies    Intolerances        LABS:                        15.6   15.3  )-----------( 219      ( 25 Dec 2017 20:47 )             44.1     12-    136  |  100  |  5<L>  ----------------------------<  126<H>  3.4<L>   |  23  |  0.88    Ca    8.6      26 Dec 2017 07:24    TPro  8.4<H>  /  Alb  5.0  /  TBili  0.8  /  DBili  x   /  AST  27  /  ALT  20  /  AlkPhos  75  12-25    PT/INR - ( 25 Dec 2017 20:47 )   PT: 11.4 sec;   INR: 1.03          PTT - ( 25 Dec 2017 20:47 )  PTT:27.3 sec  Urinalysis Basic - ( 26 Dec 2017 00:44 )    Color: Yellow / Appearance: Clear / S.025 / pH: x  Gluc: x / Ketone: >=80 mg/dL  / Bili: Negative / Urobili: 0.2 E.U./dL   Blood: x / Protein: NEGATIVE mg/dL / Nitrite: NEGATIVE   Leuk Esterase: NEGATIVE / RBC: < 5 /HPF / WBC < 5 /HPF   Sq Epi: x / Non Sq Epi: 0-5 /HPF / Bacteria: Present /HPF      CAPILLARY BLOOD GLUCOSE              RADIOLOGY & ADDITIONAL TESTS: Reviewed. HOSPITAL COURSE:    Patient is a 27 year old male with history of daily marijuana use and multiple hospitalizations for intractable vomiting (10/2016, 2016, 2016 at [facility] and a hospital in [place]) presents with vomiting x 3 days after starting to smoke marijuana after a brief hiatus. Patient was admitted to the hospital for IV hydration and further workup. Patient has been on a regimen of zofran, reglan (with Qtc wnl: 460's-420's) and has continued to wretch. Records were obtained from his stay at [facility] (currently the first documents in front of his paper chart) and pt's sister is trying to obtain documents from hospital in [place] where patient was hospitalized last year and underwent EGD/colonoscopy with bowel biopsies (normal results per family). Awaiting documents with previous GI workup to determine whether GI consult necessary. Patient currently on maintenance fluids with diet of clears.  Repleting electrolytes as needed.     OVERNIGHT EVENTS: No overnight events. Patient continues to wretch.     SUBJECTIVE / INTERVAL HPI: Patient seen and examined at bedside. Patient continues to wretch bringing up some clear mucus. Complains of ongoing abdominal discomfort and nausea.     VITAL SIGNS:  Vital Signs Last 24 Hrs  T(C): 36.9 (26 Dec 2017 07:50), Max: 37.4 (26 Dec 2017 01:01)  T(F): 98.5 (26 Dec 2017 07:50), Max: 99.3 (26 Dec 2017 01:01)  HR: 104 (26 Dec 2017 07:50) (97 - 110)  BP: 153/90 (26 Dec 2017 07:50) (134/88 - 153/90)  BP(mean): --  RR: 16 (26 Dec 2017 07:50) (16 - 20)  SpO2: 98% (26 Dec 2017 07:50) (97% - 100%)    PHYSICAL EXAM:    General: WDWN, no acute distress but looking uncomfortable, nondiaphoretic  HEENT: NC/AT; PERRL, anicteric sclera; MMM  Neck: supple  Cardiovascular: +S1/S2; fast heart rate, no murmurs/gallops/rubs  Respiratory: CTA B/L; no W/R/R  Gastrointestinal: soft, NT/ND; +BSx4, hypoactive bowel sounds  Extremities: WWP; no edema, clubbing or cyanosis  Vascular: 2+ radial, DP/PT pulses B/L  Neurological: AAOx3; 5/5 str all 4 ext,  no focal deficits    MEDICATIONS:  MEDICATIONS  (STANDING):  famotidine    Tablet 20 milliGRAM(s) Oral daily  melatonin 5 milliGRAM(s) Oral at bedtime  metoclopramide Injectable 10 milliGRAM(s) IV Push once  sodium chloride 0.9%. 1000 milliLiter(s) (100 mL/Hr) IV Continuous <Continuous>    MEDICATIONS  (PRN):  aluminum hydroxide/magnesium hydroxide/simethicone Suspension 30 milliLiter(s) Oral every 4 hours PRN Dyspepsia  ondansetron Injectable 4 milliGRAM(s) IV Push every 4 hours PRN Nausea and/or Vomiting      ALLERGIES:  Allergies    No Known Allergies    Intolerances        LABS:                        15.6   15.3  )-----------( 219      ( 25 Dec 2017 20:47 )             44.1     12-    136  |  100  |  5<L>  ----------------------------<  126<H>  3.4<L>   |  23  |  0.88    Ca    8.6      26 Dec 2017 07:24    TPro  8.4<H>  /  Alb  5.0  /  TBili  0.8  /  DBili  x   /  AST  27  /  ALT  20  /  AlkPhos  75  12-25    PT/INR - ( 25 Dec 2017 20:47 )   PT: 11.4 sec;   INR: 1.03          PTT - ( 25 Dec 2017 20:47 )  PTT:27.3 sec  Urinalysis Basic - ( 26 Dec 2017 00:44 )    Color: Yellow / Appearance: Clear / S.025 / pH: x  Gluc: x / Ketone: >=80 mg/dL  / Bili: Negative / Urobili: 0.2 E.U./dL   Blood: x / Protein: NEGATIVE mg/dL / Nitrite: NEGATIVE   Leuk Esterase: NEGATIVE / RBC: < 5 /HPF / WBC < 5 /HPF   Sq Epi: x / Non Sq Epi: 0-5 /HPF / Bacteria: Present /HPF      CAPILLARY BLOOD GLUCOSE              RADIOLOGY & ADDITIONAL TESTS: Reviewed.

## 2017-12-26 NOTE — H&P ADULT - ATTENDING COMMENTS
Pt seen and examined at bedside on 12/26 @ 4 am    Agree with HPI, ROS as above.     VS, Labs, FH, SH, allergies, medications, imaging reviewed. Agree with physical exam as above    A/P: Patient is a 27 year old male with no significant PMHx who presents with vomiting and diarrhea    **Intractable nausea/vomiting  -Given history of every day marijuana use likely cannabinoid hyperemesis syndrome given multiple previous negative workups for other anatomical or organic causes (EGD/C-scope, labs, etc)  -Will c/w IV antiemetics at this point until symptoms resolve  -C/w IV protonix  -NPO pending clinical improvement  -GII consult in AM  -EKG in AM for QtC check  -HbA1C - 5.2    **Diarrhea  -Unclear etiology , chronic  -Check stool electrolytes  -GI to see patient as above    Plan otherwise as outlined above....

## 2017-12-26 NOTE — DISCHARGE NOTE ADULT - MEDICATION SUMMARY - MEDICATIONS TO TAKE
I will START or STAY ON the medications listed below when I get home from the hospital:    Propecia  -- Indication: For Prophylactic measure

## 2017-12-26 NOTE — PROGRESS NOTE ADULT - PROBLEM SELECTOR PLAN 2
Likely related to syndrome described above. No clinical history to suggest c diff, infection or inflammatory process (patient reports normal colonoscopy in the past). No longer having symptoms since being in ED  -C/w IVF Resolved.   -C/w IVF  -awaiting stool to send stool studies

## 2017-12-26 NOTE — DISCHARGE NOTE ADULT - CARE PLAN
Principal Discharge DX:	Vomiting, intractability of vomiting not specified, presence of nausea not specified, unspecified vomiting type  Goal:	To minimize future occurrance  Instructions for follow-up, activity and diet:	You came to the hospital after vomiting for several days. You were given fluids and anti-nausea and anti-vomiting medications and you improved. Sometimes, these symptoms are the result of marijuana use. Please try to cut back on marijuana smoking to avoid future occurrences of these episodes. Please follow up with your doctor after discharge so your doctor can continue to oversee your care.  Secondary Diagnosis:	Diarrhea  Instructions for follow-up, activity and diet:	You came to the hospital complaining of diarrhea x 1 year. While in the hospital stool studies were sent to evaluate whether an infection was causing your symptoms. No infection was identified. Please follow up with your doctor after leaving the hospital so your doctor can continue to oversee your care. Principal Discharge DX:	Vomiting, intractability of vomiting not specified, presence of nausea not specified, unspecified vomiting type  Goal:	To minimize future occurrence  Instructions for follow-up, activity and diet:	You came to the hospital after vomiting for several days. You were given fluids and anti-nausea and anti-vomiting medications and you improved. Sometimes, these symptoms are the result of marijuana use. Please try to cut back on marijuana smoking to avoid future occurrences of these episodes. Please follow up with your doctor after discharge so your doctor can continue to oversee your care.  Secondary Diagnosis:	Diarrhea  Instructions for follow-up, activity and diet:	You came to the hospital complaining of diarrhea x 1 year. While in the hospital stool studies were sent to evaluate whether an infection was causing your symptoms. No infection was identified. Please follow up with your doctor after leaving the hospital so your doctor can continue to oversee your care.

## 2017-12-26 NOTE — H&P ADULT - NSHPLABSRESULTS_GEN_ALL_CORE
15.6   15.3  )-----------( 219      ( 25 Dec 2017 20:47 )             44.1         139  |  103  |  9   ----------------------------<  227<H>  3.8   |  22  |  0.93    Ca    8.1<L>      26 Dec 2017 00:24    TPro  8.4<H>  /  Alb  5.0  /  TBili  0.8  /  DBili  x   /  AST  27  /  ALT  20  /  AlkPhos  75      PT/INR - ( 25 Dec 2017 20:47 )   PT: 11.4 sec;   INR: 1.03          PTT - ( 25 Dec 2017 20:47 )  PTT:27.3 sec  Urinalysis Basic - ( 26 Dec 2017 00:44 )    Color: Yellow / Appearance: Clear / S.025 / pH: x  Gluc: x / Ketone: >=80 mg/dL  / Bili: Negative / Urobili: 0.2 E.U./dL   Blood: x / Protein: NEGATIVE mg/dL / Nitrite: NEGATIVE   Leuk Esterase: NEGATIVE / RBC: < 5 /HPF / WBC < 5 /HPF   Sq Epi: x / Non Sq Epi: 0-5 /HPF / Bacteria: Present /HPF      Lactate, Blood: 1.0 mmoL/L ( @ 22:05)      RADIOLOGY, EKG & ADDITIONAL TESTS: Reviewed.   EKG: QTc 459  < from: US Abdomen Limited (17 @ 01:26) >    No evidence of cholelithiasis.    < end of copied text >

## 2017-12-26 NOTE — H&P ADULT - PROBLEM SELECTOR PLAN 1
Etiology includes cyclic vomiting syndrome vs cannabinoid hyperemesis syndrome as patient is a daily marijuana user and reports symptom improvement with hot showers, which is a classic presentation of this syndrome. Symptoms are likely to improve with cessation from marijuana. Although patient reports normal colonoscopy/EGD, cannot rule out other etiologies like gastritis, ulcers or colitis. No dysphagia so less likely esophageal disease. No early satiety to suggest dysmotility. Exam benign and patient appears non toxic, so SBO low on differential.  -C/w zofran/reglan/pepcid/maalox  -EKG to monitor QTc  -GI consult

## 2017-12-26 NOTE — H&P ADULT - NSHPPHYSICALEXAM_GEN_ALL_CORE
Constitutional: WDWN resting comfortably in bed; NAD  Head: NC/AT  Eyes: PERRL, EOMI, anicteric sclera  ENT: no nasal discharge; uvula midline, no oropharyngeal erythema or exudates; MMM  Neck: supple; no JVD or thyromegaly  Respiratory: CTA B/L; no W/R/R, no retractions  Cardiac: +S1/S2; RRR; no M/R/G; PMI non-displaced  Gastrointestinal: soft, NT/ND; no rebound or guarding; +BSx4  Back: spine midline, no bony tenderness or step-offs; no CVAT B/L  Extremities: WWP, no clubbing or cyanosis; no peripheral edema  Musculoskeletal: NROM x4; no joint swelling, tenderness or erythema  Vascular: 2+ radial, femoral, DP/PT pulses B/L  Dermatologic: skin warm, dry and intact; no rashes, wounds, or scars  Lymphatic: no submandibular or cervical LAD  Neurologic: AAOx3; CNII-XII grossly intact; no focal deficits  Psychiatric: affect and characteristics of appearance, verbalizations, behaviors are appropriate

## 2017-12-26 NOTE — PROGRESS NOTE ADULT - PROBLEM SELECTOR PLAN 1
Etiology includes cyclic vomiting syndrome vs cannabinoid hyperemesis syndrome as patient is a daily marijuana user and reports symptom improvement with hot showers, which is a classic presentation of this syndrome. Symptoms are likely to improve with cessation from marijuana. Although patient reports normal colonoscopy/EGD, cannot rule out other etiologies like gastritis, ulcers or colitis. No dysphagia so less likely esophageal disease. No early satiety to suggest dysmotility. Exam benign and patient appears non toxic, so SBO low on differential.  -C/w zofran/reglan/pepcid/maalox  -EKG to monitor QTc  -GI consult Etiology includes cyclic vomiting syndrome vs cannabinoid hyperemesis syndrome as patient is a daily marijuana user and reports symptom improvement with hot showers, which is a classic presentation of this syndrome. Symptoms are likely to improve with cessation from marijuana. Although patient reports normal colonoscopy/EGD, cannot rule out other etiologies like gastritis, ulcers or colitis. No dysphagia so less likely esophageal disease. No early satiety to suggest dysmotility. Exam benign and patient appears non toxic, so SBO low on differential.  -C/w zofran/reglan/pepcid/maalox  -QTc wnl, can continue with antiemetics  -obtaining collateral information from [facility] and [facility] where patient previously had EGD/colonoscopy after admission for similar symptoms. If inconclusive gastric workup in records, will consider GI consult for EGD/colo and recommendations Etiology includes cyclic vomiting syndrome vs cannabinoid hyperemesis syndrome as patient is a daily marijuana user and reports symptom improvement with hot showers, which is a classic presentation of this syndrome. Symptoms are likely to improve with cessation from marijuana. Although patient reports normal colonoscopy/EGD, cannot rule out other etiologies like gastritis, ulcers or colitis. No dysphagia so less likely esophageal disease. No early satiety to suggest dysmotility. Exam benign and patient appears non toxic, so SBO low on differential.  -C/w zofran/reglan/pepcid/maalox  -QTc wnl, can continue with antiemetics  -AM EKG ordered to follow QTc. hold antiemetics for QTc >480.  -obtaining collateral information from [facility] and [facility] [place] where patient previously had EGD/colonoscopy after admission for similar symptoms. If inconclusive gastric workup in records, will consider GI consult for EGD/colo and recommendations

## 2017-12-26 NOTE — H&P ADULT - PROBLEM SELECTOR PLAN 4
Inflammatory versus infection vs stress from continuous vomiting  -If patient spikes fever, will assume GI source and send stool culture

## 2017-12-26 NOTE — PROGRESS NOTE ADULT - PROBLEM SELECTOR PLAN 3
Likely from starvation ketosis. Now resolved after IVF  -Repeat BMP to ensure closure of gap no HSQ (low improve score), clear liquid diet (advance as tolerated), replete lytes PRN

## 2017-12-26 NOTE — DISCHARGE NOTE ADULT - PLAN OF CARE
To minimize future occurrance You came to the hospital after vomiting for several days. You were given fluids and anti-nausea and anti-vomiting medications and you improved. Sometimes, these symptoms are the result of marijuana use. Please try to cut back on marijuana smoking to avoid future occurrences of these episodes. Please follow up with your doctor after discharge so your doctor can continue to oversee your care. You came to the hospital complaining of diarrhea x 1 year. While in the hospital stool studies were sent to evaluate whether an infection was causing your symptoms. No infection was identified. Please follow up with your doctor after leaving the hospital so your doctor can continue to oversee your care. To minimize future occurrence

## 2017-12-26 NOTE — H&P ADULT - PROBLEM SELECTOR PLAN 2
Likely related to syndrome described above. No clinical history to suggest c diff, infection or inflammatory process (patient reports normal colonoscopy in the past). No longer having symptoms since being in ED  -C/w IVF

## 2017-12-27 LAB
AMPHET UR-MCNC: NEGATIVE — SIGNIFICANT CHANGE UP
ANION GAP SERPL CALC-SCNC: 11 MMOL/L — SIGNIFICANT CHANGE UP (ref 5–17)
APPEARANCE UR: CLEAR — SIGNIFICANT CHANGE UP
BARBITURATES UR SCN-MCNC: NEGATIVE — SIGNIFICANT CHANGE UP
BENZODIAZ UR-MCNC: NEGATIVE — SIGNIFICANT CHANGE UP
BILIRUB UR-MCNC: NEGATIVE — SIGNIFICANT CHANGE UP
BUN SERPL-MCNC: 4 MG/DL — LOW (ref 7–23)
CALCIUM SERPL-MCNC: 9 MG/DL — SIGNIFICANT CHANGE UP (ref 8.4–10.5)
CHLORIDE SERPL-SCNC: 99 MMOL/L — SIGNIFICANT CHANGE UP (ref 96–108)
CO2 SERPL-SCNC: 26 MMOL/L — SIGNIFICANT CHANGE UP (ref 22–31)
COCAINE METAB.OTHER UR-MCNC: NEGATIVE — SIGNIFICANT CHANGE UP
COLOR SPEC: YELLOW — SIGNIFICANT CHANGE UP
CREAT SERPL-MCNC: 0.91 MG/DL — SIGNIFICANT CHANGE UP (ref 0.5–1.3)
DIFF PNL FLD: (no result)
EPI CELLS # UR: SIGNIFICANT CHANGE UP /HPF (ref 0–5)
GLUCOSE SERPL-MCNC: 144 MG/DL — HIGH (ref 70–99)
GLUCOSE UR QL: NEGATIVE — SIGNIFICANT CHANGE UP
HCT VFR BLD CALC: 41.1 % — SIGNIFICANT CHANGE UP (ref 39–50)
HGB BLD-MCNC: 14.5 G/DL — SIGNIFICANT CHANGE UP (ref 13–17)
KETONES UR-MCNC: NEGATIVE — SIGNIFICANT CHANGE UP
LEUKOCYTE ESTERASE UR-ACNC: NEGATIVE — SIGNIFICANT CHANGE UP
MAGNESIUM SERPL-MCNC: 2 MG/DL — SIGNIFICANT CHANGE UP (ref 1.6–2.6)
MCHC RBC-ENTMCNC: 30 PG — SIGNIFICANT CHANGE UP (ref 27–34)
MCHC RBC-ENTMCNC: 35.3 G/DL — SIGNIFICANT CHANGE UP (ref 32–36)
MCV RBC AUTO: 84.9 FL — SIGNIFICANT CHANGE UP (ref 80–100)
METHADONE UR-MCNC: NEGATIVE — SIGNIFICANT CHANGE UP
NITRITE UR-MCNC: NEGATIVE — SIGNIFICANT CHANGE UP
OPIATES UR-MCNC: NEGATIVE — SIGNIFICANT CHANGE UP
PCP SPEC-MCNC: SIGNIFICANT CHANGE UP
PCP SPEC-MCNC: SIGNIFICANT CHANGE UP
PCP UR-MCNC: NEGATIVE — SIGNIFICANT CHANGE UP
PH UR: 7.5 — SIGNIFICANT CHANGE UP (ref 5–8)
PLATELET # BLD AUTO: 183 K/UL — SIGNIFICANT CHANGE UP (ref 150–400)
POTASSIUM SERPL-MCNC: 3.8 MMOL/L — SIGNIFICANT CHANGE UP (ref 3.5–5.3)
POTASSIUM SERPL-SCNC: 3.8 MMOL/L — SIGNIFICANT CHANGE UP (ref 3.5–5.3)
PROT UR-MCNC: NEGATIVE MG/DL — SIGNIFICANT CHANGE UP
RBC # BLD: 4.84 M/UL — SIGNIFICANT CHANGE UP (ref 4.2–5.8)
RBC # FLD: 11.7 % — SIGNIFICANT CHANGE UP (ref 10.3–16.9)
RBC CASTS # UR COMP ASSIST: < 5 /HPF — SIGNIFICANT CHANGE UP
SODIUM SERPL-SCNC: 136 MMOL/L — SIGNIFICANT CHANGE UP (ref 135–145)
SP GR SPEC: 1.01 — SIGNIFICANT CHANGE UP (ref 1–1.03)
THC UR QL: POSITIVE
UROBILINOGEN FLD QL: 0.2 E.U./DL — SIGNIFICANT CHANGE UP
WBC # BLD: 6.8 K/UL — SIGNIFICANT CHANGE UP (ref 3.8–10.5)
WBC # FLD AUTO: 6.8 K/UL — SIGNIFICANT CHANGE UP (ref 3.8–10.5)
WBC UR QL: < 5 /HPF — SIGNIFICANT CHANGE UP

## 2017-12-27 PROCEDURE — 99233 SBSQ HOSP IP/OBS HIGH 50: CPT | Mod: GC

## 2017-12-27 PROCEDURE — 93010 ELECTROCARDIOGRAM REPORT: CPT

## 2017-12-27 RX ORDER — ONDANSETRON 8 MG/1
4 TABLET, FILM COATED ORAL
Qty: 0 | Refills: 0 | Status: DISCONTINUED | OUTPATIENT
Start: 2017-12-27 | End: 2017-12-27

## 2017-12-27 RX ORDER — ONDANSETRON 8 MG/1
4 TABLET, FILM COATED ORAL ONCE
Qty: 0 | Refills: 0 | Status: COMPLETED | OUTPATIENT
Start: 2017-12-27 | End: 2017-12-27

## 2017-12-27 RX ORDER — ONDANSETRON 8 MG/1
4 TABLET, FILM COATED ORAL EVERY 6 HOURS
Qty: 0 | Refills: 0 | Status: DISCONTINUED | OUTPATIENT
Start: 2017-12-27 | End: 2017-12-28

## 2017-12-27 RX ORDER — POTASSIUM CHLORIDE 20 MEQ
10 PACKET (EA) ORAL ONCE
Qty: 0 | Refills: 0 | Status: COMPLETED | OUTPATIENT
Start: 2017-12-27 | End: 2017-12-27

## 2017-12-27 RX ADMIN — Medication 100 MILLIEQUIVALENT(S): at 12:29

## 2017-12-27 RX ADMIN — Medication 10 MILLIGRAM(S): at 17:27

## 2017-12-27 RX ADMIN — ONDANSETRON 4 MILLIGRAM(S): 8 TABLET, FILM COATED ORAL at 03:24

## 2017-12-27 RX ADMIN — ONDANSETRON 4 MILLIGRAM(S): 8 TABLET, FILM COATED ORAL at 12:29

## 2017-12-27 RX ADMIN — SODIUM CHLORIDE 120 MILLILITER(S): 9 INJECTION, SOLUTION INTRAVENOUS at 17:31

## 2017-12-27 RX ADMIN — Medication 10 MILLIGRAM(S): at 09:16

## 2017-12-27 RX ADMIN — Medication 1 MILLIGRAM(S): at 04:55

## 2017-12-27 RX ADMIN — PANTOPRAZOLE SODIUM 40 MILLIGRAM(S): 20 TABLET, DELAYED RELEASE ORAL at 12:29

## 2017-12-27 RX ADMIN — ONDANSETRON 4 MILLIGRAM(S): 8 TABLET, FILM COATED ORAL at 23:47

## 2017-12-27 RX ADMIN — ONDANSETRON 4 MILLIGRAM(S): 8 TABLET, FILM COATED ORAL at 20:36

## 2017-12-27 NOTE — CONSULT NOTE ADULT - ATTENDING COMMENTS
Agree with above plan and assessment by Dr. Judd, overall story most c/w hyperemesis from cannabis overuse. Focus should be on anti-emetics, supportive care, sedating/sleep enhancement rx and reassurance, drug counseling. Psych consult to advise on benzo dosing and choice, ? role of Haldol as 2nd line.

## 2017-12-27 NOTE — PROGRESS NOTE ADULT - PROBLEM SELECTOR PLAN 1
Etiology includes cyclic vomiting syndrome vs cannabinoid hyperemesis syndrome as patient is a daily marijuana user and reports symptom improvement with hot showers, which is a classic presentation of this syndrome. Symptoms are likely to improve with cessation from marijuana. Although patient reports normal colonoscopy/EGD, cannot rule out other etiologies like gastritis, ulcers or colitis. No dysphagia so less likely esophageal disease. No early satiety to suggest dysmotility. Exam benign and patient appears non toxic, so SBO low on differential.  -C/w zofran/reglan/pepcid/maalox  -QTc wnl, can continue with antiemetics  -AM EKG ordered to follow QTc. hold antiemetics for QTc >480.  - Patient with persistent symptoms consulted GI today, GI recommending observation given patient clinical improvement in the afternoon will re-evaluate tomorrow   - f/u further GI recs

## 2017-12-27 NOTE — CONSULT NOTE ADULT - ASSESSMENT
27yr old M with PMHx significant for cannabis use who presents for evaluation of nausea and emesis.    # Nausea/Emesis -  - ddx - cannabis hyperemesis syndrome, cyclic vomiting syndrome, viral gastroenteritis, idiopathic gastroparesis  - patient reports that hot showers helps with symptoms  - continue IVF  - continue antiemetics for now - reglan and zofran  - continue low dose benzodiazepine (consider giving nightly dose to help with sleep short term)  - encourage minimal po intake - crackers, ice chips till less queasy - can try clears tomorrow  - patients symptoms will most likely improve with time  - no need for endoscopic procedures at this time  - consider outpatient work-up for gastroparesis - gastric emptying study  - consider outpatient work up for chronic diarrhea      Discussed with attending Dr Platt  GI will sign off for now, please reconsult as needed

## 2017-12-27 NOTE — CONSULT NOTE ADULT - SUBJECTIVE AND OBJECTIVE BOX
HPI:  27yr old M with PMHx significant for cannabis use who presents for evaluation of nausea and emesis.  According to patient he developed nausea and emesis a week prior to presentation, emesis is mainly post prandial, and then followed by persistent retching and bilious emesis, denies blood or coffee grounds, no associated abdominal pain. He has had diarrhea for about a year now (brownish stool, no blood, no mucus). Patient endorses some weight loss since onset of his current bout of emesis, but none long term. He has had similar presentation about a year ago, and was Rx conservatively, and a repeat episode almost 3 months later, at which time he underwent an EGD and colonoscopy - which he was told were negative. He uses cannabis daily for the last 6yrs. Patient was feeling better with the conservative Rx yesterday, but then started feeling worse again.    EGD -   Colonoscopy -       PAST MEDICAL & SURGICAL HISTORY:  No pertinent past medical history  History of appendectomy      REVIEW OF SYSTEMS  Apart from items noted in the HPI a 10point ROS was negative    MEDICATIONS  (STANDING):  dextrose 5% + lactated ringers. 1000 milliLiter(s) (120 mL/Hr) IV Continuous <Continuous>  pantoprazole  Injectable 40 milliGRAM(s) IV Push daily    MEDICATIONS  (PRN):  metoclopramide Injectable 10 milliGRAM(s) IV Push every 8 hours PRN nausea/vomiting.  ondansetron Injectable 4 milliGRAM(s) IV Push every 6 hours PRN Nausea and/or Vomiting    Allergies  No Known Allergies    Intolerances    SOCIAL HISTORY:  Denies tobacco use, drinks socially, and uses cannabis daily    FAMILY HISTORY:  Family history of gastric cancer (great-aunt)      Vital Signs Last 24 Hrs  T(C): 37.4 (27 Dec 2017 15:57), Max: 37.4 (27 Dec 2017 15:57)  T(F): 99.4 (27 Dec 2017 15:57), Max: 99.4 (27 Dec 2017 15:57)  HR: 78 (27 Dec 2017 15:57) (66 - 97)  BP: 116/64 (27 Dec 2017 15:57) (107/66 - 149/80)  BP(mean): --  RR: 18 (27 Dec 2017 15:57) (16 - 18)  SpO2: 97% (27 Dec 2017 15:57) (97% - 100%)    PHYSICAL EXAM:  GEN - young male lying in bed in no distress, anicteric, afebrile, not pale  Respiratory: good air entry  Cardiovascular: s1 s2 no M RRR  Gastrointestinal: full, soft, BS+, NT, NR, NG, no masses or organs palpated  Extremities: no edema  Neurological: AAOx3 non focal    LABS:                    14.5   6.8   )-----------( 183      ( 27 Dec 2017 06:08 )             41.1     136  |  99  |  4<L>  ----------------------------<  144<H>  3.8   |  26  |  0.91    Ca    9.0      27 Dec 2017 06:07  Mg     2.0     12-    TPro  8.4<H>  /  Alb  5.0  /  TBili  0.8  /  DBili  x   /  AST  27  /  ALT  20  /  AlkPhos  75  12-    PT/INR - ( 25 Dec 2017 20:47 )   PT: 11.4 sec;   INR: 1.03        PTT - ( 25 Dec 2017 20:47 )  PTT:27.3 sec    Urinalysis Basic - ( 26 Dec 2017 00:44 )  Color: Yellow / Appearance: Clear / S.025 / pH: x  Gluc: x / Ketone: >=80 mg/dL  / Bili: Negative / Urobili: 0.2 E.U./dL   Blood: x / Protein: NEGATIVE mg/dL / Nitrite: NEGATIVE   Leuk Esterase: NEGATIVE / RBC: < 5 /HPF / WBC < 5 /HPF   Sq Epi: x / Non Sq Epi: 0-5 /HPF / Bacteria: Present /HPF    U tox - done      RADIOLOGY & ADDITIONAL STUDIES:  US Abdomen Limited (17 @ 01:26) >  IMPRESSION:  No evidence of cholelithiasis.    Xray Abdomen Series Flat/Upright 2 View (17 @ 02:04) >  FINDINGS: Frontal and lateral views of the chest demonstrates mild elevation of the left hemidiaphragm. Normal heart size. No consolidation.   No pleural effusion. No active chest disease.    Supine and upright views of the abdomen demonstrates appropriate bowel gas pattern with air within large and small bowel. No evidence of significant bowel dilatation or differential air-fluid level to suggest obstruction. No free air. Post surgical clips overlying the right hemipelvis.

## 2017-12-27 NOTE — PROGRESS NOTE ADULT - SUBJECTIVE AND OBJECTIVE BOX
OVERNIGHT/Interval EVENTS:   Patient overnight given melatonin for insomnia, patient with persistent nausea with multiple episodes of emesis despite reglan and zofran, patient given ativan 1mg IVP with some relief. Patient today with persistent dry heaving and a few episodes of vomiting non-bloody, non-bilious. Patient seen by GI, with some improvement of symptoms during the day.      SUBJECTIVE / INTERVAL HPI: Patient seen and examined at bedside complaining of nausea and episodes of vomiting and inability to tolerate PO.     VITAL SIGNS:  Vital Signs Last 24 Hrs  T(C): 37.4 (27 Dec 2017 15:57), Max: 37.4 (27 Dec 2017 15:57)  T(F): 99.4 (27 Dec 2017 15:57), Max: 99.4 (27 Dec 2017 15:57)  HR: 78 (27 Dec 2017 15:57) (66 - 97)  BP: 116/64 (27 Dec 2017 15:57) (107/66 - 149/80)  BP(mean): --  RR: 18 (27 Dec 2017 15:57) (16 - 18)  SpO2: 97% (27 Dec 2017 15:57) (97% - 100%)    PHYSICAL EXAM:    General: Uncomfortable appearing, non-diaphoretic, dry heaving.   HEENT: NC/AT; PERRL, MMM  Neck: supple  Cardiovascular: +S1/S2; tachycardic, no murmurs/gallops/rubs  Respiratory: CTA B/L; no W/R/R  Gastrointestinal: soft, mild tenderness to palpation of epigastric region; +BSx4, hypoactive bowel sounds  Extremities: WWP; no edema  Vascular: 2+ radial pulses  Neurological: AAOx3; 5/5 str all 4 ext,  no focal deficits    MEDICATIONS  (STANDING):  dextrose 5% + lactated ringers. 1000 milliLiter(s) (120 mL/Hr) IV Continuous <Continuous>  pantoprazole  Injectable 40 milliGRAM(s) IV Push daily    MEDICATIONS  (PRN):  metoclopramide Injectable 10 milliGRAM(s) IV Push every 8 hours PRN nausea/vomiting.  ondansetron Injectable 4 milliGRAM(s) IV Push every 6 hours PRN Nausea and/or Vomiting    ALLERGIES:  Allergies    No Known Allergies    Intolerances      LABS:                         14.5   6.8   )-----------( 183      ( 27 Dec 2017 06:08 )             41.1         136  |  99  |  4<L>  ----------------------------<  144<H>  3.8   |  26  |  0.91    Ca    9.0      27 Dec 2017 06:07  Mg     2.0         TPro  8.4<H>  /  Alb  5.0  /  TBili  0.8  /  DBili  x   /  AST  27  /  ALT  20  /  AlkPhos  75      PT/INR - ( 25 Dec 2017 20:47 )   PT: 11.4 sec;   INR: 1.03          PTT - ( 25 Dec 2017 20:47 )  PTT:27.3 sec  Urinalysis Basic - ( 26 Dec 2017 00:44 )    Color: Yellow / Appearance: Clear / S.025 / pH: x  Gluc: x / Ketone: >=80 mg/dL  / Bili: Negative / Urobili: 0.2 E.U./dL   Blood: x / Protein: NEGATIVE mg/dL / Nitrite: NEGATIVE   Leuk Esterase: NEGATIVE / RBC: < 5 /HPF / WBC < 5 /HPF   Sq Epi: x / Non Sq Epi: 0-5 /HPF / Bacteria: Present /HPF                RADIOLOGY, EKG & ADDITIONAL TESTS: Reviewed.

## 2017-12-27 NOTE — PROGRESS NOTE ADULT - ASSESSMENT
Patient is a 27 year old male with no significant PMHx who presents with vomiting in the context of daily marijuana use.

## 2017-12-28 DIAGNOSIS — F12.188 CANNABIS ABUSE WITH OTHER CANNABIS-INDUCED DISORDER: ICD-10-CM

## 2017-12-28 DIAGNOSIS — F41.9 ANXIETY DISORDER, UNSPECIFIED: ICD-10-CM

## 2017-12-28 LAB
ANION GAP SERPL CALC-SCNC: 19 MMOL/L — HIGH (ref 5–17)
BUN SERPL-MCNC: 5 MG/DL — LOW (ref 7–23)
CALCIUM SERPL-MCNC: 9.6 MG/DL — SIGNIFICANT CHANGE UP (ref 8.4–10.5)
CHLORIDE SERPL-SCNC: 98 MMOL/L — SIGNIFICANT CHANGE UP (ref 96–108)
CO2 SERPL-SCNC: 22 MMOL/L — SIGNIFICANT CHANGE UP (ref 22–31)
CREAT SERPL-MCNC: 0.85 MG/DL — SIGNIFICANT CHANGE UP (ref 0.5–1.3)
GLUCOSE SERPL-MCNC: 125 MG/DL — HIGH (ref 70–99)
HCT VFR BLD CALC: 42.4 % — SIGNIFICANT CHANGE UP (ref 39–50)
HGB BLD-MCNC: 15.1 G/DL — SIGNIFICANT CHANGE UP (ref 13–17)
MAGNESIUM SERPL-MCNC: 2.1 MG/DL — SIGNIFICANT CHANGE UP (ref 1.6–2.6)
MCHC RBC-ENTMCNC: 30 PG — SIGNIFICANT CHANGE UP (ref 27–34)
MCHC RBC-ENTMCNC: 35.6 G/DL — SIGNIFICANT CHANGE UP (ref 32–36)
MCV RBC AUTO: 84.3 FL — SIGNIFICANT CHANGE UP (ref 80–100)
PHOSPHATE SERPL-MCNC: 2.6 MG/DL — SIGNIFICANT CHANGE UP (ref 2.5–4.5)
PLATELET # BLD AUTO: 210 K/UL — SIGNIFICANT CHANGE UP (ref 150–400)
POTASSIUM SERPL-MCNC: 3.8 MMOL/L — SIGNIFICANT CHANGE UP (ref 3.5–5.3)
POTASSIUM SERPL-SCNC: 3.8 MMOL/L — SIGNIFICANT CHANGE UP (ref 3.5–5.3)
RBC # BLD: 5.03 M/UL — SIGNIFICANT CHANGE UP (ref 4.2–5.8)
RBC # FLD: 11.9 % — SIGNIFICANT CHANGE UP (ref 10.3–16.9)
SODIUM SERPL-SCNC: 139 MMOL/L — SIGNIFICANT CHANGE UP (ref 135–145)
WBC # BLD: 9.6 K/UL — SIGNIFICANT CHANGE UP (ref 3.8–10.5)
WBC # FLD AUTO: 9.6 K/UL — SIGNIFICANT CHANGE UP (ref 3.8–10.5)

## 2017-12-28 PROCEDURE — 99253 IP/OBS CNSLTJ NEW/EST LOW 45: CPT

## 2017-12-28 PROCEDURE — 99233 SBSQ HOSP IP/OBS HIGH 50: CPT | Mod: GC

## 2017-12-28 PROCEDURE — 93010 ELECTROCARDIOGRAM REPORT: CPT

## 2017-12-28 RX ORDER — POTASSIUM CHLORIDE 20 MEQ
10 PACKET (EA) ORAL ONCE
Qty: 0 | Refills: 0 | Status: DISCONTINUED | OUTPATIENT
Start: 2017-12-28 | End: 2017-12-28

## 2017-12-28 RX ORDER — ONDANSETRON 8 MG/1
4 TABLET, FILM COATED ORAL EVERY 6 HOURS
Qty: 0 | Refills: 0 | Status: DISCONTINUED | OUTPATIENT
Start: 2017-12-28 | End: 2017-12-28

## 2017-12-28 RX ORDER — POTASSIUM CHLORIDE 20 MEQ
10 PACKET (EA) ORAL ONCE
Qty: 0 | Refills: 0 | Status: COMPLETED | OUTPATIENT
Start: 2017-12-28 | End: 2017-12-28

## 2017-12-28 RX ORDER — ONDANSETRON 8 MG/1
4 TABLET, FILM COATED ORAL EVERY 4 HOURS
Qty: 0 | Refills: 0 | Status: DISCONTINUED | OUTPATIENT
Start: 2017-12-28 | End: 2017-12-31

## 2017-12-28 RX ADMIN — Medication 1 MILLIGRAM(S): at 02:24

## 2017-12-28 RX ADMIN — PANTOPRAZOLE SODIUM 40 MILLIGRAM(S): 20 TABLET, DELAYED RELEASE ORAL at 11:58

## 2017-12-28 RX ADMIN — ONDANSETRON 4 MILLIGRAM(S): 8 TABLET, FILM COATED ORAL at 09:53

## 2017-12-28 RX ADMIN — ONDANSETRON 4 MILLIGRAM(S): 8 TABLET, FILM COATED ORAL at 21:34

## 2017-12-28 RX ADMIN — Medication 204 MILLIGRAM(S): at 18:41

## 2017-12-28 RX ADMIN — SODIUM CHLORIDE 120 MILLILITER(S): 9 INJECTION, SOLUTION INTRAVENOUS at 09:53

## 2017-12-28 RX ADMIN — SODIUM CHLORIDE 120 MILLILITER(S): 9 INJECTION, SOLUTION INTRAVENOUS at 18:42

## 2017-12-28 RX ADMIN — Medication 100 MILLIEQUIVALENT(S): at 09:53

## 2017-12-28 RX ADMIN — Medication 202 MILLIGRAM(S): at 14:37

## 2017-12-28 RX ADMIN — Medication 10 MILLIGRAM(S): at 06:10

## 2017-12-28 RX ADMIN — Medication 1 MILLIGRAM(S): at 23:53

## 2017-12-28 RX ADMIN — SODIUM CHLORIDE 120 MILLILITER(S): 9 INJECTION, SOLUTION INTRAVENOUS at 01:18

## 2017-12-28 RX ADMIN — ONDANSETRON 4 MILLIGRAM(S): 8 TABLET, FILM COATED ORAL at 13:38

## 2017-12-28 RX ADMIN — ONDANSETRON 4 MILLIGRAM(S): 8 TABLET, FILM COATED ORAL at 17:23

## 2017-12-28 RX ADMIN — Medication 204 MILLIGRAM(S): at 23:27

## 2017-12-28 NOTE — DIETITIAN INITIAL EVALUATION ADULT. - OTHER INFO
26 yo/male admitted with severe N/V/D, likely thought to be d/t cannabis induced hyperemesis per MD note. Pt is NPO/Clear Liquids x3 days in hospital. Pt seen in room, awake, alert, pleasant, though clearly in distress/discomfort, mother at bedside. Per MD notes, this has occurred to patient in the past, while traveling in different parts of world; resolves with time, and symptoms do not return if patient is not smoking. Pt reports that last full meal and start of N/V was 12/18. Also accompanied by diarrhea. NKFA; per pt's mother, does not tolerate spicy, fatty/greasy foods. No difficulty chewing or swallowing. Has lost 3# over 1 week. Currently receiving reglan, zofran, and promethazine injection. GI following- no need for endoscopic evaluation at this time per note. Will continue to monitor for need to start TPN.

## 2017-12-28 NOTE — BEHAVIORAL HEALTH ASSESSMENT NOTE - SUMMARY
Patient is 27 YM with no prior psychiatric hx; marijuana use for last 5 years; employed; presented to medical ER for evaluation of progressive nausea and vomiting for 3 weeks. Pt has hx of 2 prior episodes of hyperemesis in context of marijuana use. Pt use of marijuana does not correspond to reported average use of 16 yr +/- 3 years associated with Cannabis Hyperemesis Syndrome (CHS) but there are few reports of up to 3 years use of marijuana associated with CHS. In context of absence of comorbid psychiatric illness, migraine headaches, triggering pathologic and physical factors and resolution of symptoms with cessation of cannabis us;, absence of pathologic factors and pancreatobiliary disease, pt's condition favors hyperemetic phase of CHS vs cyclic vomiting syndrome or GI pathology.

## 2017-12-28 NOTE — PROGRESS NOTE ADULT - PROBLEM SELECTOR PLAN 1
Etiology includes cyclic vomiting syndrome vs cannabinoid hyperemesis syndrome as patient is a daily marijuana user and reports symptom improvement with hot showers, which is a classic presentation of this syndrome. Symptoms are likely to improve with cessation from marijuana. Although patient reports normal colonoscopy/EGD, cannot rule out other etiologies like gastritis, ulcers or colitis. No dysphagia so less likely esophageal disease. No early satiety to suggest dysmotility. Exam benign and patient appears non toxic, so SBO low on differential.  -C/w zofran/reglan/pepcid/maalox  -QTc wnl, can continue with antiemetics  -AM EKG ordered to follow QTc. hold antiemetics for QTc >480.  - Patient with persistent symptoms consulted GI today, GI recommending observation given patient clinical improvement in the afternoon will re-evaluate tomorrow   - f/u further GI recs Etiology includes cyclic vomiting syndrome vs cannabinoid hyperemesis syndrome as patient is a daily marijuana user and reports symptom improvement with hot showers, which is a classic presentation of this syndrome. Symptoms are likely to improve with cessation from marijuana. Although patient reports normal colonoscopy/EGD, cannot rule out other etiologies like gastritis, ulcers or colitis. No dysphagia so less likely esophageal disease. No early satiety to suggest dysmotility. Exam benign and patient appears non toxic, so SBO low on differential.  - GI consulted: regimen changed to zofran and phenergan every 4 hours with relief of symptoms  - Psych consulted will see tonight will give recs regarding adding benzos or haldol to regimen.    -QTc wnl, can continue with antiemetics.  -AM EKG ordered to follow QTc. hold antiemetics for QTc >480.

## 2017-12-28 NOTE — DIETITIAN INITIAL EVALUATION ADULT. - NUTRITIONGOAL OUTCOME1
Pt will be able to tolerate 50% or greater PO intake w/in 24hrs, or TPN initiation will be considere

## 2017-12-28 NOTE — CHART NOTE - NSCHARTNOTEFT_GEN_A_CORE
Upon Nutritional Assessment by the Registered Dietitian your patient was determined to meet criteria / has evidence of the following diagnosis/diagnoses:          [ ]  Mild Protein Calorie Malnutrition        [ X]  Moderate Protein Calorie Malnutrition        [ ] Severe Protein Calorie Malnutrition        [ ] Unspecified Protein Calorie Malnutrition        [ ] Underweight / BMI <19        [ ] Morbid Obesity / BMI > 40      Findings as based on:  •  Comprehensive nutrition assessment and consultation  •  Calorie counts (nutrient intake analysis)  •  Food acceptance and intake status from observations by staff  •  Follow up  •  Patient education  •  Intervention secondary to interdisciplinary rounds  •   concerns    Inadequate energy intake of <75% for >7 days, and weight loss of 1-2% for 1 week.     Treatment:    The following diet has been recommended:  Continue with Clear Liquid diet and advance as tolerated; if patient still NPO/Clears/not tolerating by day 6 consider initiation of TPN     PROVIDER Section:     By signing this assessment you are acknowledging and agree with the diagnosis/diagnoses assigned by the Registered Dietitian    Comments:

## 2017-12-28 NOTE — BEHAVIORAL HEALTH ASSESSMENT NOTE - PROBLEM SELECTOR PLAN 1
Ondansetron 8 mg Q 8 hr  Phenergan 25 mg Q 6 hr  IV Ativan 1 mg Q 6 hr  Prochlorperazine 5 mg once and evaluate further

## 2017-12-28 NOTE — BEHAVIORAL HEALTH ASSESSMENT NOTE - HPI (INCLUDE ILLNESS QUALITY, SEVERITY, DURATION, TIMING, CONTEXT, MODIFYING FACTORS, ASSOCIATED SIGNS AND SYMPTOMS)
As per primary team:  "Patient is a 27 year old male with history of daily marijuana use and multiple hospitalizations for intractable vomiting (10/2016, 11/2016, 12/2016 at [facility] and a hospital in [place]) presents with vomiting x 3 days after starting to smoke marijuana after a brief hiatus. Patient was admitted to the hospital for IV hydration and further workup. Patient has been on a regimen of zofran, reglan (with Qtc wnl: 460's-420's) and has continued to wretch."    Patient is 27 YM with no prior psychiatric hx; marijuana use for last 5 years; employed; presented to medical ER for evaluation of progressive nausea and vomiting for 3 weeks. Pt reports throwing up multiple times on the [email or web address] roque requiring evaluation. Pt is unable to identify any specific trigger or alleviating factor; reports two prior episodes last year in Oct, requiring inpatient hospitalization for 4 days, another followed after one month of discharge. Pt reports sobriety period of almost 2 weeks following last episode and reports using 1-2 joints daily in the evening for almost past 1 year.     PPH: Reports seeing psychiatrist once, 2 yrs back, for anxiety associated with expressing sexual orientation as rosas. Denies any other.  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    Patient is evaluated in his room, alert; oriented and easy to engage; reports mood as anxious and irritable in context of current episode with constricted affect; denies issues with sleep, concentration and energy level; reports decreased appetite. Denies suicidal and homicidal ideation intent and plan. Discusses plan to quit using marijuana; thought process is linear; denies AVH and delusions not verbalized.   Pt denies episodes of panic attack; sign and symptoms of manic episode.

## 2017-12-28 NOTE — BEHAVIORAL HEALTH ASSESSMENT NOTE - NSBHCONSULTPRIMARYDISCUSSYES_PSY_A_CORE FT
Treatment plan and rationale to continue Ativan PRN, consider response to a dose of prochlorperazine, consider starting Neurontin

## 2017-12-28 NOTE — BEHAVIORAL HEALTH ASSESSMENT NOTE - NSBHCHARTREVIEWVS_PSY_A_CORE FT
Vital Signs Last 24 Hrs  T(C): 37.1 (28 Dec 2017 20:39), Max: 37.6 (28 Dec 2017 13:46)  T(F): 98.7 (28 Dec 2017 20:39), Max: 99.6 (28 Dec 2017 13:46)  HR: 67 (28 Dec 2017 20:39) (64 - 98)  BP: 140/92 (28 Dec 2017 20:39) (103/66 - 156/96)  BP(mean): --  RR: 18 (28 Dec 2017 20:39) (17 - 20)  SpO2: 98% (28 Dec 2017 20:39) (95% - 99%)

## 2017-12-28 NOTE — PROGRESS NOTE ADULT - SUBJECTIVE AND OBJECTIVE BOX
OVERNIGHT/Interval EVENTS:   Patient overnight given melatonin for insomnia, patient with persistent nausea with multiple episodes of emesis despite reglan and zofran, patient given ativan 1mg IVP with some relief. Patient today with persistent dry heaving and a few episodes of vomiting non-bloody, non-bilious. Patient seen by GI, with some improvement of symptoms during the day.      SUBJECTIVE / INTERVAL HPI: Patient seen and examined at bedside complaining of nausea and episodes of vomiting and inability to tolerate PO.     VITAL SIGNS:  Vital Signs Last 24 Hrs  T(C): 37.4 (27 Dec 2017 15:57), Max: 37.4 (27 Dec 2017 15:57)  T(F): 99.4 (27 Dec 2017 15:57), Max: 99.4 (27 Dec 2017 15:57)  HR: 78 (27 Dec 2017 15:57) (66 - 97)  BP: 116/64 (27 Dec 2017 15:57) (107/66 - 149/80)  BP(mean): --  RR: 18 (27 Dec 2017 15:57) (16 - 18)  SpO2: 97% (27 Dec 2017 15:57) (97% - 100%)    PHYSICAL EXAM:    General: Uncomfortable appearing, non-diaphoretic, dry heaving.   HEENT: NC/AT; PERRL, MMM  Neck: supple  Cardiovascular: +S1/S2; tachycardic, no murmurs/gallops/rubs  Respiratory: CTA B/L; no W/R/R  Gastrointestinal: soft, mild tenderness to palpation of epigastric region; +BSx4, hypoactive bowel sounds  Extremities: WWP; no edema  Vascular: 2+ radial pulses  Neurological: AAOx3; 5/5 str all 4 ext,  no focal deficits    MEDICATIONS  (STANDING):  dextrose 5% + lactated ringers. 1000 milliLiter(s) (120 mL/Hr) IV Continuous <Continuous>  pantoprazole  Injectable 40 milliGRAM(s) IV Push daily    MEDICATIONS  (PRN):  metoclopramide Injectable 10 milliGRAM(s) IV Push every 8 hours PRN nausea/vomiting.  ondansetron Injectable 4 milliGRAM(s) IV Push every 6 hours PRN Nausea and/or Vomiting    ALLERGIES:  Allergies    No Known Allergies    Intolerances      LABS:                         14.5   6.8   )-----------( 183      ( 27 Dec 2017 06:08 )             41.1         136  |  99  |  4<L>  ----------------------------<  144<H>  3.8   |  26  |  0.91    Ca    9.0      27 Dec 2017 06:07  Mg     2.0         TPro  8.4<H>  /  Alb  5.0  /  TBili  0.8  /  DBili  x   /  AST  27  /  ALT  20  /  AlkPhos  75      PT/INR - ( 25 Dec 2017 20:47 )   PT: 11.4 sec;   INR: 1.03          PTT - ( 25 Dec 2017 20:47 )  PTT:27.3 sec  Urinalysis Basic - ( 26 Dec 2017 00:44 )    Color: Yellow / Appearance: Clear / S.025 / pH: x  Gluc: x / Ketone: >=80 mg/dL  / Bili: Negative / Urobili: 0.2 E.U./dL   Blood: x / Protein: NEGATIVE mg/dL / Nitrite: NEGATIVE   Leuk Esterase: NEGATIVE / RBC: < 5 /HPF / WBC < 5 /HPF   Sq Epi: x / Non Sq Epi: 0-5 /HPF / Bacteria: Present /HPF                RADIOLOGY, EKG & ADDITIONAL TESTS: Reviewed. OVERNIGHT/Interval EVENTS:   Patient with multiple episodes of vomiting and nausea overnight, with episode of blood streaked vomit.  Patient seen by GI started on phenergan and zofran.  Patient with improvement of symptoms.  Patient started on ativan 1mg at night.      SUBJECTIVE / INTERVAL HPI: Patient this AM complaining of nausea, vomiting, blood streaked vomit.  Patient in the PM improved, states tolerated sips of water, with no further vomiting episodes.    VITAL SIGNS:  Vital Signs Last 24 Hrs  T(C): 37.2 (28 Dec 2017 17:31), Max: 37.6 (28 Dec 2017 13:46)  T(F): 98.9 (28 Dec 2017 17:31), Max: 99.6 (28 Dec 2017 13:46)  HR: 70 (28 Dec 2017 16:27) (64 - 98)  BP: 156/96 (28 Dec 2017 16:27) (103/66 - 156/96)  BP(mean): --  RR: 18 (28 Dec 2017 16:27) (17 - 20)  SpO2: 97% (28 Dec 2017 16:27) (95% - 99%)    PHYSICAL EXAM:  AM Exam  General: Uncomfortable appearing, dry heaving.  HEENT: NC/AT; PERRL, dry mucous membranes  Neck: supple  Cardiovascular: +S1/S2; tachycardic, no murmurs/gallops/rubs  Respiratory: CTA B/L; no W/R/R  Gastrointestinal: soft, mild tenderness to palpation of epigastric region; +BSx4, hypoactive bowel sounds  Extremities: WWP; no edema  Vascular: 2+ radial pulses  Neurological: AAOx3; 5/5 str all 4 ext,  no focal deficits    MEDICATIONS  (STANDING):  dextrose 5% + lactated ringers. 1000 milliLiter(s) (120 mL/Hr) IV Continuous <Continuous>  LORazepam     Tablet 1 milliGRAM(s) Oral every 6 hours  ondansetron Injectable 4 milliGRAM(s) IV Push every 4 hours  pantoprazole  Injectable 40 milliGRAM(s) IV Push daily  promethazine IVPB 25 milliGRAM(s) IV Intermittent every 4 hours    MEDICATIONS  (PRN):      ALLERGIES:  Allergies    No Known Allergies    Intolerances    LABS:                         15.1   9.6   )-----------( 210      ( 28 Dec 2017 07:39 )             42.4         139  |  98  |  5<L>  ----------------------------<  125<H>  3.8   |  22  |  0.85    Ca    9.6      28 Dec 2017 07:11  Phos  2.6       Mg     2.1             Urinalysis Basic - ( 27 Dec 2017 22:05 )    Color: Yellow / Appearance: Clear / S.010 / pH: x  Gluc: x / Ketone: NEGATIVE  / Bili: Negative / Urobili: 0.2 E.U./dL   Blood: x / Protein: NEGATIVE mg/dL / Nitrite: NEGATIVE   Leuk Esterase: NEGATIVE / RBC: < 5 /HPF / WBC < 5 /HPF   Sq Epi: x / Non Sq Epi: 0-5 /HPF / Bacteria: x                RADIOLOGY, EKG & ADDITIONAL TESTS: Reviewed.

## 2017-12-28 NOTE — ED BEHAVIORAL HEALTH NOTE - BEHAVIORAL HEALTH NOTE
Patient, 27 y.o. [place], male, employed, who is currently residing in [facility], visiting his family over the [place]  Holiday with previous hx significant for Cannabis use severe, Cannabis Hyperemesis syndrome admitted to Medicine/GI with severe uncontrollable vomiting,  nausea, indigestion after daily use/1 joint of cannabis.  Consult is requested by Medicine to help the patients' symptoms, suggesting Ativan and Haldol.  Patient is seen with PGY 1 resident Dr. Ramirez SERRANO (pelase see the full note by the resident)    CC/HPI Patient is complaining on continuos discomfort, nausea for the last week due to more heavy use of Cannabis on every day basis. Patient reported vomiting for the last 4-5 days, which brought him to [facility]. Currently can not tolerate any food and even liquid may influence some indigestion, nausea and intermittent vomiting, now with bile and previously with the food content. Patient was given Phenergan 25mg q 6hr and Reglan with minimal relief and later administered Ativan 0.5 mg IV, which brought some temporary relief.  Patient is cooperative with exam, reports some underlying intermittent anxiety, which mostly related to his 'coming out of the closet' two years ago to his family of being rosas and feeling more comfortable with acceptance on who he is. Patient states, that even he had been using Cannabis for the last 6 years, the most intensive use he admitted for the last 2 years, when started 'coming out'.  Patient denies any previous psychiatric history, beside the psychotherapy/counseling concerning his fit to the society of feeling comfortable in his sex orientation. Denies any periods of depression, psychosis, zeus. No SI/HO though the lifetime.  No current SI/HI. Denies any AH/VH.    Patient reports 2 similar episodes of severe vomiting due to sever Cannabis use, which brought him to ER in [place], last October, 2016., and another episode of severe vomiting due to Cannabis use within the month of the d/c in [place], [facility]. Patient stated, that both times 'the only thing was helping medications which made him relaxed and put to sleep' and he 'recovered in 4 days'  Patient understands the profound influence of Cannabis on his current condition and willing to stop Cannabis use after his d/c. States, this is the first time he considers to make a serious move in terms of his recovery.    DS. Cannabis use, severe. Cannabis induced hyperemesis syndrome.  Recommended: Prochlorperazine (compazine) 5mg IV now.,  Lorazepam 1mg q 6hr while awake.,  Continue Phenergan 25mg IV q 6hr., Odansetron 8mg q8hr IV  Gabapentin, start 100mg tid as soon as able to tolerate PO,  Follow up consult with psychiatry CL.      Agnes Hernandez M.D., Ph.D  Attending psychiatrist.  mobile: [phone]    Case is discussed with primary team, Dr. Hinojosa.  [phone]

## 2017-12-28 NOTE — PROGRESS NOTE ADULT - SUBJECTIVE AND OBJECTIVE BOX
Patient seen and evaluated at bedside  Retching during eval, with some streaks of blood with emesis.  Reports that he feels uncomfortable and would like to get some sleep if possible    PLAN -  # Cannabis Hyperemesis Syndrome -   - but could also be CVS, viral gastro, motility disorder  - will recommend   1. continue zofran  2. stop reglan  3. switch to phenergan 25mg IM q6h  4. please dose antiemetics to make sure he gets something every 3hrs  5. place on PRN low dose benzodiazepine (consider a long acting benzo)  6. Consider psych input for use of haldol (suggested dosing in 5mg IV once) and redosing   7. Continue bowel rest and start clears when patient feeling better  8. IVF  9. No indication at this time for endoscopic procedures  10. Counselled on cannabis cessation    Discussed with attending Dr Platt

## 2017-12-28 NOTE — DIETITIAN INITIAL EVALUATION ADULT. - ENERGY NEEDS
Height 72"; #; #; 94%IBW  BMI 22.8  ABW used for calculations as pt between % of IBW. Fluid needs increased 2/2 recent losses

## 2017-12-29 LAB
ANION GAP SERPL CALC-SCNC: 12 MMOL/L — SIGNIFICANT CHANGE UP (ref 5–17)
ANION GAP SERPL CALC-SCNC: 12 MMOL/L — SIGNIFICANT CHANGE UP (ref 5–17)
BUN SERPL-MCNC: 5 MG/DL — LOW (ref 7–23)
BUN SERPL-MCNC: 5 MG/DL — LOW (ref 7–23)
CALCIUM SERPL-MCNC: 9.2 MG/DL — SIGNIFICANT CHANGE UP (ref 8.4–10.5)
CALCIUM SERPL-MCNC: 9.3 MG/DL — SIGNIFICANT CHANGE UP (ref 8.4–10.5)
CHLORIDE SERPL-SCNC: 100 MMOL/L — SIGNIFICANT CHANGE UP (ref 96–108)
CHLORIDE SERPL-SCNC: 94 MMOL/L — LOW (ref 96–108)
CO2 SERPL-SCNC: 27 MMOL/L — SIGNIFICANT CHANGE UP (ref 22–31)
CO2 SERPL-SCNC: 28 MMOL/L — SIGNIFICANT CHANGE UP (ref 22–31)
CREAT SERPL-MCNC: 0.96 MG/DL — SIGNIFICANT CHANGE UP (ref 0.5–1.3)
CREAT SERPL-MCNC: 0.99 MG/DL — SIGNIFICANT CHANGE UP (ref 0.5–1.3)
GLUCOSE SERPL-MCNC: 100 MG/DL — HIGH (ref 70–99)
GLUCOSE SERPL-MCNC: 112 MG/DL — HIGH (ref 70–99)
HCT VFR BLD CALC: 43.7 % — SIGNIFICANT CHANGE UP (ref 39–50)
HGB BLD-MCNC: 15.4 G/DL — SIGNIFICANT CHANGE UP (ref 13–17)
MAGNESIUM SERPL-MCNC: 2 MG/DL — SIGNIFICANT CHANGE UP (ref 1.6–2.6)
MAGNESIUM SERPL-MCNC: 2 MG/DL — SIGNIFICANT CHANGE UP (ref 1.6–2.6)
MCHC RBC-ENTMCNC: 29.6 PG — SIGNIFICANT CHANGE UP (ref 27–34)
MCHC RBC-ENTMCNC: 35.2 G/DL — SIGNIFICANT CHANGE UP (ref 32–36)
MCV RBC AUTO: 83.9 FL — SIGNIFICANT CHANGE UP (ref 80–100)
PHOSPHATE SERPL-MCNC: 3.6 MG/DL — SIGNIFICANT CHANGE UP (ref 2.5–4.5)
PLATELET # BLD AUTO: 198 K/UL — SIGNIFICANT CHANGE UP (ref 150–400)
POTASSIUM SERPL-MCNC: 3 MMOL/L — LOW (ref 3.5–5.3)
POTASSIUM SERPL-MCNC: 3.2 MMOL/L — LOW (ref 3.5–5.3)
POTASSIUM SERPL-SCNC: 3 MMOL/L — LOW (ref 3.5–5.3)
POTASSIUM SERPL-SCNC: 3.2 MMOL/L — LOW (ref 3.5–5.3)
RBC # BLD: 5.21 M/UL — SIGNIFICANT CHANGE UP (ref 4.2–5.8)
RBC # FLD: 11.6 % — SIGNIFICANT CHANGE UP (ref 10.3–16.9)
SODIUM SERPL-SCNC: 134 MMOL/L — LOW (ref 135–145)
SODIUM SERPL-SCNC: 139 MMOL/L — SIGNIFICANT CHANGE UP (ref 135–145)
WBC # BLD: 8.8 K/UL — SIGNIFICANT CHANGE UP (ref 3.8–10.5)
WBC # FLD AUTO: 8.8 K/UL — SIGNIFICANT CHANGE UP (ref 3.8–10.5)

## 2017-12-29 PROCEDURE — 93010 ELECTROCARDIOGRAM REPORT: CPT

## 2017-12-29 PROCEDURE — 99233 SBSQ HOSP IP/OBS HIGH 50: CPT

## 2017-12-29 RX ORDER — ACETAMINOPHEN 500 MG
650 TABLET ORAL ONCE
Qty: 0 | Refills: 0 | Status: COMPLETED | OUTPATIENT
Start: 2017-12-29 | End: 2017-12-29

## 2017-12-29 RX ORDER — POTASSIUM CHLORIDE 20 MEQ
40 PACKET (EA) ORAL ONCE
Qty: 0 | Refills: 0 | Status: COMPLETED | OUTPATIENT
Start: 2017-12-29 | End: 2017-12-29

## 2017-12-29 RX ORDER — POTASSIUM CHLORIDE 20 MEQ
10 PACKET (EA) ORAL
Qty: 0 | Refills: 0 | Status: COMPLETED | OUTPATIENT
Start: 2017-12-29 | End: 2017-12-29

## 2017-12-29 RX ADMIN — ONDANSETRON 4 MILLIGRAM(S): 8 TABLET, FILM COATED ORAL at 22:07

## 2017-12-29 RX ADMIN — PANTOPRAZOLE SODIUM 40 MILLIGRAM(S): 20 TABLET, DELAYED RELEASE ORAL at 11:00

## 2017-12-29 RX ADMIN — ONDANSETRON 4 MILLIGRAM(S): 8 TABLET, FILM COATED ORAL at 17:21

## 2017-12-29 RX ADMIN — Medication 1 MILLIGRAM(S): at 06:35

## 2017-12-29 RX ADMIN — ONDANSETRON 4 MILLIGRAM(S): 8 TABLET, FILM COATED ORAL at 01:14

## 2017-12-29 RX ADMIN — Medication 100 MILLIEQUIVALENT(S): at 22:07

## 2017-12-29 RX ADMIN — SODIUM CHLORIDE 120 MILLILITER(S): 9 INJECTION, SOLUTION INTRAVENOUS at 04:02

## 2017-12-29 RX ADMIN — SODIUM CHLORIDE 120 MILLILITER(S): 9 INJECTION, SOLUTION INTRAVENOUS at 23:43

## 2017-12-29 RX ADMIN — ONDANSETRON 4 MILLIGRAM(S): 8 TABLET, FILM COATED ORAL at 05:56

## 2017-12-29 RX ADMIN — Medication 100 MILLIEQUIVALENT(S): at 11:00

## 2017-12-29 RX ADMIN — Medication 650 MILLIGRAM(S): at 01:29

## 2017-12-29 RX ADMIN — ONDANSETRON 4 MILLIGRAM(S): 8 TABLET, FILM COATED ORAL at 09:14

## 2017-12-29 RX ADMIN — Medication 204 MILLIGRAM(S): at 23:43

## 2017-12-29 RX ADMIN — Medication 100 MILLIEQUIVALENT(S): at 17:55

## 2017-12-29 RX ADMIN — Medication 204 MILLIGRAM(S): at 06:36

## 2017-12-29 RX ADMIN — SODIUM CHLORIDE 120 MILLILITER(S): 9 INJECTION, SOLUTION INTRAVENOUS at 11:00

## 2017-12-29 RX ADMIN — Medication 100 MILLIEQUIVALENT(S): at 09:14

## 2017-12-29 RX ADMIN — Medication 1 MILLIGRAM(S): at 12:33

## 2017-12-29 RX ADMIN — Medication 40 MILLIEQUIVALENT(S): at 17:45

## 2017-12-29 RX ADMIN — ONDANSETRON 4 MILLIGRAM(S): 8 TABLET, FILM COATED ORAL at 12:35

## 2017-12-29 RX ADMIN — Medication 204 MILLIGRAM(S): at 19:55

## 2017-12-29 RX ADMIN — Medication 650 MILLIGRAM(S): at 02:43

## 2017-12-29 RX ADMIN — Medication 204 MILLIGRAM(S): at 15:24

## 2017-12-29 RX ADMIN — Medication 100 MILLIEQUIVALENT(S): at 20:15

## 2017-12-29 RX ADMIN — Medication 100 MILLIEQUIVALENT(S): at 13:26

## 2017-12-29 RX ADMIN — Medication 204 MILLIGRAM(S): at 03:08

## 2017-12-29 RX ADMIN — Medication 204 MILLIGRAM(S): at 11:00

## 2017-12-29 NOTE — PROGRESS NOTE ADULT - SUBJECTIVE AND OBJECTIVE BOX
INCOMPLETE  OVERNIGHT/Interval EVENTS:   Patient with multiple episodes of vomiting and nausea overnight, with episode of blood streaked vomit.  Patient seen by GI started on phenergan and zofran.  Patient with improvement of symptoms.  Patient started on ativan 1mg at night.      SUBJECTIVE / INTERVAL HPI: Patient this AM complaining of nausea, vomiting, blood streaked vomit.  Patient in the PM improved, states tolerated sips of water, with no further vomiting episodes.    VITAL SIGNS:  Vital Signs Last 24 Hrs  T(C): 37.2 (28 Dec 2017 17:31), Max: 37.6 (28 Dec 2017 13:46)  T(F): 98.9 (28 Dec 2017 17:31), Max: 99.6 (28 Dec 2017 13:46)  HR: 70 (28 Dec 2017 16:27) (64 - 98)  BP: 156/96 (28 Dec 2017 16:27) (103/66 - 156/96)  BP(mean): --  RR: 18 (28 Dec 2017 16:27) (17 - 20)  SpO2: 97% (28 Dec 2017 16:27) (95% - 99%)    PHYSICAL EXAM:  AM Exam  General: Uncomfortable appearing, dry heaving.  HEENT: NC/AT; PERRL, dry mucous membranes  Neck: supple  Cardiovascular: +S1/S2; tachycardic, no murmurs/gallops/rubs  Respiratory: CTA B/L; no W/R/R  Gastrointestinal: soft, mild tenderness to palpation of epigastric region; +BSx4, hypoactive bowel sounds  Extremities: WWP; no edema  Vascular: 2+ radial pulses  Neurological: AAOx3; 5/5 str all 4 ext,  no focal deficits    MEDICATIONS  (STANDING):  dextrose 5% + lactated ringers. 1000 milliLiter(s) (120 mL/Hr) IV Continuous <Continuous>  LORazepam     Tablet 1 milliGRAM(s) Oral every 6 hours  ondansetron Injectable 4 milliGRAM(s) IV Push every 4 hours  pantoprazole  Injectable 40 milliGRAM(s) IV Push daily  promethazine IVPB 25 milliGRAM(s) IV Intermittent every 4 hours    MEDICATIONS  (PRN):      ALLERGIES:  Allergies    No Known Allergies    Intolerances    LABS:                         15.1   9.6   )-----------( 210      ( 28 Dec 2017 07:39 )             42.4         139  |  98  |  5<L>  ----------------------------<  125<H>  3.8   |  22  |  0.85    Ca    9.6      28 Dec 2017 07:11  Phos  2.6       Mg     2.1             Urinalysis Basic - ( 27 Dec 2017 22:05 )    Color: Yellow / Appearance: Clear / S.010 / pH: x  Gluc: x / Ketone: NEGATIVE  / Bili: Negative / Urobili: 0.2 E.U./dL   Blood: x / Protein: NEGATIVE mg/dL / Nitrite: NEGATIVE   Leuk Esterase: NEGATIVE / RBC: < 5 /HPF / WBC < 5 /HPF   Sq Epi: x / Non Sq Epi: 0-5 /HPF / Bacteria: x                RADIOLOGY, EKG & ADDITIONAL TESTS: Reviewed. OVERNIGHT/Interval EVENTS:   Patient states he has had no further episodes of vomiting overnight.  Tolerated a bottle of water.  States he feels much improved.     VITAL SIGNS:  Vital Signs Last 24 Hrs  T(C): 36.5 (29 Dec 2017 16:08), Max: 37.7 (29 Dec 2017 05:14)  T(F): 97.7 (29 Dec 2017 16:08), Max: 99.9 (29 Dec 2017 05:14)  HR: 72 (29 Dec 2017 16:08) (61 - 73)  BP: 118/77 (29 Dec 2017 16:08) (115/73 - 140/92)  BP(mean): --  RR: 18 (29 Dec 2017 16:08) (17 - 18)  SpO2: 97% (29 Dec 2017 16:08) (96% - 99%)    PHYSICAL EXAM:  AM Exam  General: NAD.   HEENT: NC/AT; PERRL, dry mucous membranes  Neck: supple  Cardiovascular: +S1/S2; tachycardic, no murmurs/gallops/rubs  Respiratory: CTA B/L; no W/R/R  Gastrointestinal: soft, mild tenderness to palpation of epigastric region; +BSx4, hypoactive bowel sounds  Extremities: WWP; no edema  Vascular: 2+ radial pulses  Neurological: AAOx3; 5/5 str all 4 ext,  no focal deficits    MEDICATIONS  (STANDING):  dextrose 5% + lactated ringers. 1000 milliLiter(s) (120 mL/Hr) IV Continuous <Continuous>  LORazepam   Injectable 1 milliGRAM(s) IV Push every 6 hours  ondansetron Injectable 4 milliGRAM(s) IV Push every 4 hours  pantoprazole  Injectable 40 milliGRAM(s) IV Push daily  potassium chloride  10 mEq/100 mL IVPB 10 milliEquivalent(s) IV Intermittent every 1 hour  promethazine IVPB 25 milliGRAM(s) IV Intermittent every 4 hours    MEDICATIONS  (PRN):        ALLERGIES:  Allergies    No Known Allergies    Intolerances  .  LABS:                         15.4   8.8   )-----------( 198      ( 29 Dec 2017 06:31 )             43.7         139  |  100  |  5<L>  ----------------------------<  100<H>  3.2<L>   |  27  |  0.99    Ca    9.2      29 Dec 2017 15:33  Phos  3.6     -  Mg     2.0     -        Urinalysis Basic - ( 27 Dec 2017 22:05 )    Color: Yellow / Appearance: Clear / S.010 / pH: x  Gluc: x / Ketone: NEGATIVE  / Bili: Negative / Urobili: 0.2 E.U./dL   Blood: x / Protein: NEGATIVE mg/dL / Nitrite: NEGATIVE   Leuk Esterase: NEGATIVE / RBC: < 5 /HPF / WBC < 5 /HPF   Sq Epi: x / Non Sq Epi: 0-5 /HPF / Bacteria: x                RADIOLOGY, EKG & ADDITIONAL TESTS: Reviewed.                 RADIOLOGY, EKG & ADDITIONAL TESTS: Reviewed.

## 2017-12-29 NOTE — PROGRESS NOTE ADULT - PROBLEM SELECTOR PLAN 1
Etiology includes cyclic vomiting syndrome vs cannabinoid hyperemesis syndrome as patient is a daily marijuana user and reports symptom improvement with hot showers, which is a classic presentation of this syndrome. Symptoms are likely to improve with cessation from marijuana. Although patient reports normal colonoscopy/EGD, cannot rule out other etiologies like gastritis, ulcers or colitis. No dysphagia so less likely esophageal disease. No early satiety to suggest dysmotility. Exam benign and patient appears non toxic, so SBO low on differential.  - GI consulted: regimen changed to zofran and phenergan every 4 hours with relief of symptoms  - Psych consulted will see tonight will give recs regarding adding benzos or haldol to regimen.    -QTc wnl, can continue with antiemetics.  -AM EKG ordered to follow QTc. hold antiemetics for QTc >480. Etiology includes cyclic vomiting syndrome vs cannabinoid hyperemesis syndrome as patient is a daily marijuana user and reports symptom improvement with hot showers, which is a classic presentation of this syndrome. Symptoms are likely to improve with cessation from marijuana. Although patient reports normal colonoscopy/EGD, cannot rule out other etiologies like gastritis, ulcers or colitis. No dysphagia so less likely esophageal disease. No early satiety to suggest dysmotility. Exam benign and patient appears non toxic, so SBO low on differential.  - GI consulted: c/w regimen zofran and phenergan every 4 hours, patient with relief of symptoms  - psych following recommended 1mg ativan q 6hours started yesterday will continue and will address tapering off over the weekend   -QTc wnl, can continue with antiemetics.  -AM EKG ordered to follow QTc. hold antiemetics for QTc >480.

## 2017-12-29 NOTE — PROGRESS NOTE BEHAVIORAL HEALTH - SUMMARY
28 yo [place] male, raised in [place] who now lives in [place] admitted for severe N/V felt secondary to MJ abuse.  Patient's symptoms now improving with prn meds including Ativan.   Patient admits to multiple stressors including sexual identity (rosas male) and family (mother present in room exhibiting strong anxiety/controlling behaviors).   Patient able to engage in a supportive therapy session about MJ use, identifying stressors and coping in alternative ways.

## 2017-12-29 NOTE — PROGRESS NOTE ADULT - PROBLEM SELECTOR PLAN 2
no HSQ (low improve score), clear liquid diet (advance as tolerated), replete lytes PRN no HSQ (low improve score), advanced to full liquids this PM, replete lytes PRN

## 2017-12-29 NOTE — PROGRESS NOTE BEHAVIORAL HEALTH - NSBHADMITCOUNSEL_PSY_A_CORE
client/family/caregiver education/instructions for management, treatment and follow up/diagnostic results/impressions and/or recommended studies/importance of adherence to chosen treatment/risks and benefits of treatment options/risk factor reduction

## 2017-12-29 NOTE — PROGRESS NOTE BEHAVIORAL HEALTH - NSBHCONSULTMEDS_PSY_A_CORE FT
On Ativan 1mg iv q6hr.   As team knows Haldol has also been used with good effect per literature.  Haldol is available as po/im and can be used if symptoms worsen but if used, would stop Phenergan as they both can increase QTc and risk of Tardive/EPS.   If needed, can try Haldol 1-2mg po/im q6 hr PRN.   (Note would not use Haldol iv unless no other access available and on telemetry or routine EKGs being followed given risk of Torsades).

## 2017-12-30 LAB
ANION GAP SERPL CALC-SCNC: 13 MMOL/L — SIGNIFICANT CHANGE UP (ref 5–17)
ANION GAP SERPL CALC-SCNC: 16 MMOL/L — SIGNIFICANT CHANGE UP (ref 5–17)
BUN SERPL-MCNC: 6 MG/DL — LOW (ref 7–23)
BUN SERPL-MCNC: 6 MG/DL — LOW (ref 7–23)
CALCIUM SERPL-MCNC: 9.3 MG/DL — SIGNIFICANT CHANGE UP (ref 8.4–10.5)
CALCIUM SERPL-MCNC: 9.5 MG/DL — SIGNIFICANT CHANGE UP (ref 8.4–10.5)
CHLORIDE SERPL-SCNC: 97 MMOL/L — SIGNIFICANT CHANGE UP (ref 96–108)
CHLORIDE SERPL-SCNC: 99 MMOL/L — SIGNIFICANT CHANGE UP (ref 96–108)
CO2 SERPL-SCNC: 24 MMOL/L — SIGNIFICANT CHANGE UP (ref 22–31)
CO2 SERPL-SCNC: 25 MMOL/L — SIGNIFICANT CHANGE UP (ref 22–31)
CREAT SERPL-MCNC: 0.93 MG/DL — SIGNIFICANT CHANGE UP (ref 0.5–1.3)
CREAT SERPL-MCNC: 1.07 MG/DL — SIGNIFICANT CHANGE UP (ref 0.5–1.3)
GLUCOSE SERPL-MCNC: 105 MG/DL — HIGH (ref 70–99)
GLUCOSE SERPL-MCNC: 123 MG/DL — HIGH (ref 70–99)
HCT VFR BLD CALC: 45.4 % — SIGNIFICANT CHANGE UP (ref 39–50)
HGB BLD-MCNC: 16.2 G/DL — SIGNIFICANT CHANGE UP (ref 13–17)
MAGNESIUM SERPL-MCNC: 2.2 MG/DL — SIGNIFICANT CHANGE UP (ref 1.6–2.6)
MCHC RBC-ENTMCNC: 30.5 PG — SIGNIFICANT CHANGE UP (ref 27–34)
MCHC RBC-ENTMCNC: 35.7 G/DL — SIGNIFICANT CHANGE UP (ref 32–36)
MCV RBC AUTO: 85.3 FL — SIGNIFICANT CHANGE UP (ref 80–100)
PLATELET # BLD AUTO: 189 K/UL — SIGNIFICANT CHANGE UP (ref 150–400)
POTASSIUM SERPL-MCNC: 3.6 MMOL/L — SIGNIFICANT CHANGE UP (ref 3.5–5.3)
POTASSIUM SERPL-MCNC: 3.6 MMOL/L — SIGNIFICANT CHANGE UP (ref 3.5–5.3)
POTASSIUM SERPL-SCNC: 3.6 MMOL/L — SIGNIFICANT CHANGE UP (ref 3.5–5.3)
POTASSIUM SERPL-SCNC: 3.6 MMOL/L — SIGNIFICANT CHANGE UP (ref 3.5–5.3)
RBC # BLD: 5.32 M/UL — SIGNIFICANT CHANGE UP (ref 4.2–5.8)
RBC # FLD: 12.2 % — SIGNIFICANT CHANGE UP (ref 10.3–16.9)
SODIUM SERPL-SCNC: 137 MMOL/L — SIGNIFICANT CHANGE UP (ref 135–145)
SODIUM SERPL-SCNC: 137 MMOL/L — SIGNIFICANT CHANGE UP (ref 135–145)
WBC # BLD: 11 K/UL — HIGH (ref 3.8–10.5)
WBC # FLD AUTO: 11 K/UL — HIGH (ref 3.8–10.5)

## 2017-12-30 PROCEDURE — 99233 SBSQ HOSP IP/OBS HIGH 50: CPT | Mod: GC

## 2017-12-30 RX ORDER — POTASSIUM CHLORIDE 20 MEQ
40 PACKET (EA) ORAL ONCE
Qty: 0 | Refills: 0 | Status: COMPLETED | OUTPATIENT
Start: 2017-12-30 | End: 2017-12-30

## 2017-12-30 RX ORDER — POTASSIUM CHLORIDE 20 MEQ
10 PACKET (EA) ORAL
Qty: 0 | Refills: 0 | Status: COMPLETED | OUTPATIENT
Start: 2017-12-30 | End: 2017-12-30

## 2017-12-30 RX ORDER — POTASSIUM CHLORIDE 20 MEQ
40 PACKET (EA) ORAL ONCE
Qty: 0 | Refills: 0 | Status: DISCONTINUED | OUTPATIENT
Start: 2017-12-30 | End: 2017-12-30

## 2017-12-30 RX ADMIN — ONDANSETRON 4 MILLIGRAM(S): 8 TABLET, FILM COATED ORAL at 09:27

## 2017-12-30 RX ADMIN — Medication 204 MILLIGRAM(S): at 04:31

## 2017-12-30 RX ADMIN — Medication 204 MILLIGRAM(S): at 07:39

## 2017-12-30 RX ADMIN — Medication 100 MILLIEQUIVALENT(S): at 13:24

## 2017-12-30 RX ADMIN — ONDANSETRON 4 MILLIGRAM(S): 8 TABLET, FILM COATED ORAL at 13:24

## 2017-12-30 RX ADMIN — ONDANSETRON 4 MILLIGRAM(S): 8 TABLET, FILM COATED ORAL at 21:51

## 2017-12-30 RX ADMIN — SODIUM CHLORIDE 120 MILLILITER(S): 9 INJECTION, SOLUTION INTRAVENOUS at 17:25

## 2017-12-30 RX ADMIN — PANTOPRAZOLE SODIUM 40 MILLIGRAM(S): 20 TABLET, DELAYED RELEASE ORAL at 11:25

## 2017-12-30 RX ADMIN — ONDANSETRON 4 MILLIGRAM(S): 8 TABLET, FILM COATED ORAL at 06:26

## 2017-12-30 RX ADMIN — ONDANSETRON 4 MILLIGRAM(S): 8 TABLET, FILM COATED ORAL at 17:21

## 2017-12-30 RX ADMIN — Medication 100 MILLIEQUIVALENT(S): at 11:25

## 2017-12-30 RX ADMIN — Medication 40 MILLIEQUIVALENT(S): at 07:00

## 2017-12-30 RX ADMIN — Medication 100 MILLIEQUIVALENT(S): at 15:18

## 2017-12-30 NOTE — PROGRESS NOTE ADULT - SUBJECTIVE AND OBJECTIVE BOX
OVERNIGHT/Interval EVENTS:   Patient with no overnight events.     Subjective: Patient states he feels much improved.  Tolerating full liquid diet.     VITAL SIGNS:  Vital Signs Last 24 Hrs  T(C): 37.8 (30 Dec 2017 05:16), Max: 37.8 (30 Dec 2017 05:16)  T(F): 100 (30 Dec 2017 05:16), Max: 100 (30 Dec 2017 05:16)  HR: 76 (30 Dec 2017 05:16) (70 - 80)  BP: 144/94 (30 Dec 2017 05:16) (118/77 - 144/94)  BP(mean): --  RR: 20 (30 Dec 2017 05:16) (17 - 20)  SpO2: 100% (30 Dec 2017 05:16) (97% - 100%)    PHYSICAL EXAM:  AM Exam  General: NAD.   HEENT: NC/AT; PERRL, dry mucous membranes  Neck: supple  Cardiovascular: +S1/S2; tachycardic, no murmurs/gallops/rubs  Respiratory: CTA B/L; no W/R/R  Gastrointestinal: soft, mild tenderness to palpation of epigastric region; +BSx4, hypoactive bowel sounds  Extremities: WWP; no edema  Vascular: 2+ radial pulses  Neurological: AAOx3; 5/5 str all 4 ext,  no focal deficits    MEDICATIONS  (STANDING):  dextrose 5% + lactated ringers. 1000 milliLiter(s) (120 mL/Hr) IV Continuous <Continuous>  LORazepam   Injectable 1 milliGRAM(s) IV Push every 6 hours  ondansetron Injectable 4 milliGRAM(s) IV Push every 4 hours  pantoprazole  Injectable 40 milliGRAM(s) IV Push daily  potassium chloride    Tablet ER 40 milliEquivalent(s) Oral once  promethazine IVPB 25 milliGRAM(s) IV Intermittent every 4 hours    MEDICATIONS  (PRN):          ALLERGIES:  Allergies    No Known Allergies    Intolerances    .  LABS:                         15.4   8.8   )-----------( 198      ( 29 Dec 2017 06:31 )             43.7     12-30    137  |  99  |  6<L>  ----------------------------<  123<H>  3.6   |  25  |  0.93    Ca    9.5      30 Dec 2017 03:21  Phos  3.6     12-29  Mg     2.0     12-29                    RADIOLOGY, EKG & ADDITIONAL TESTS: Reviewed.

## 2017-12-30 NOTE — PROGRESS NOTE ADULT - PROBLEM SELECTOR PLAN 1
Etiology includes cyclic vomiting syndrome vs cannabinoid hyperemesis syndrome as patient is a daily marijuana user and reports symptom improvement with hot showers, which is a classic presentation of this syndrome. Symptoms are likely to improve with cessation from marijuana. Although patient reports normal colonoscopy/EGD, cannot rule out other etiologies like gastritis, ulcers or colitis. No dysphagia so less likely esophageal disease. No early satiety to suggest dysmotility. Exam benign and patient appears non toxic, so SBO low on differential.  - GI consulted: c/w regimen zofran and phenergan every 4 hours, patient with relief of symptoms  - psych following recommended 1mg ativan q 6hours started yesterday will continue and will address tapering off over the weekend   -QTc wnl, can continue with antiemetics.  -AM EKG ordered to follow QTc. hold antiemetics for QTc >480. Etiology includes cyclic vomiting syndrome vs cannabinoid hyperemesis syndrome as patient is a daily marijuana user and reports symptom improvement with hot showers, which is a classic presentation of this syndrome. Symptoms are likely to improve with cessation from marijuana. Although patient reports normal colonoscopy/EGD, cannot rule out other etiologies like gastritis, ulcers or colitis. No dysphagia so less likely esophageal disease. No early satiety to suggest dysmotility. Exam benign and patient appears non toxic, so SBO low on differential.  - GI consulted: c/w regimen zofran, phenergan and ativan switched to PRN  -Q Tc wnl, can continue with antiemetics.  -AM EKG ordered to follow QTc. hold antiemetics for QTc >480.  - advanced diet to soft food today, tolerating

## 2017-12-30 NOTE — PROGRESS NOTE ADULT - PROBLEM SELECTOR PROBLEM 1
Vomiting, intractability of vomiting not specified, presence of nausea not specified, unspecified vomiting type

## 2017-12-30 NOTE — PROGRESS NOTE ADULT - PROVIDER SPECIALTY LIST ADULT
Gastroenterology
Internal Medicine

## 2017-12-30 NOTE — PROGRESS NOTE ADULT - ATTENDING COMMENTS
dx:  1)intractable vomiting - he continues to vomit. has had 2  other episodes of similar presentation. s/p extensive work up in [place] (egd/c scope/ct scans) without diagnosis. LFTs/lipase wnl.  may either be cyclic vomiting vs canabis hyperemesis syndrome exacerbated by acute gastroenteritis?  adv diet as tolerated,  ivfs, zofran/reglan prn.  check urine PBG, porhyrins (if not done so at OSH)  GI consulted, recs appreciated.    2)starvation ketoacidosis- dextrose added to IVFs.     3) chronic diarrhea - work up as above. can follow up with GI as an outpt.
dx:  1)intractable vomiting - he had  no emesis x 48hrs, but vomited up kcl PO this AM.    s/p extensive work up in [place] (egd/c scope/ct scans) without diagnosis. LFTs/lipase wnl. THC (+) on utox. suspect canabis hyperemesis syndrome.   adv diet as tolerated,  ivfs, anti-emetics  . monitor qtc daily. on lorazepam prn  GI /psych consulted, recs appreciated.    2)starvation ketoacidosis- dextrose added to IVFs.     3) chronic diarrhea - work up as above. can follow up with GI as an outpt. suspect IBS.
dx:  1)intractable vomiting - no emesis x 24hrs.    s/p extensive work up in [place] (egd/c scope/ct scans) without diagnosis. LFTs/lipase wnl. pt denies marijuana use x 10 days, but still (+) on utox. suspect cyclic vomiting vs canabis hyperemesis syndrome.   adv diet as tolerated,  ivfs, anti-emetics prn.  monitor qtc daily. on lorazepam.   check urine PBG, porhyrins (if not done so at OSH)  GI /psych consulted, recs appreciated.    2)starvation ketoacidosis- dextrose added to IVFs.     3) chronic diarrhea - work up as above. can follow up with GI as an outpt. suspect IBS
dx:  intractable vomiting - he continues to vomit. has had 2  other episodes of similar presentation. s/p extensive work up in [place] (egd/c scope/ct scans) without diagnosis. may either be cyclic vomiting vs canabis hyperemesis syndrome. NPO, ivfs, zofran/reglan prn. LFTs/lipase wnl. check urine PBG, porhyrins (if not done so at OSH)    starvation ketoacidosis- dextrose added to IVFs

## 2017-12-31 VITALS
OXYGEN SATURATION: 100 % | TEMPERATURE: 97 F | SYSTOLIC BLOOD PRESSURE: 133 MMHG | RESPIRATION RATE: 18 BRPM | HEART RATE: 96 BPM | DIASTOLIC BLOOD PRESSURE: 81 MMHG

## 2017-12-31 LAB
AMPHET UR-MCNC: NEGATIVE — SIGNIFICANT CHANGE UP
ANION GAP SERPL CALC-SCNC: 12 MMOL/L — SIGNIFICANT CHANGE UP (ref 5–17)
APPEARANCE UR: CLEAR — SIGNIFICANT CHANGE UP
BARBITURATES UR SCN-MCNC: NEGATIVE — SIGNIFICANT CHANGE UP
BENZODIAZ UR-MCNC: NEGATIVE — SIGNIFICANT CHANGE UP
BILIRUB UR-MCNC: NEGATIVE — SIGNIFICANT CHANGE UP
BUN SERPL-MCNC: 7 MG/DL — SIGNIFICANT CHANGE UP (ref 7–23)
CALCIUM SERPL-MCNC: 9.1 MG/DL — SIGNIFICANT CHANGE UP (ref 8.4–10.5)
CHLORIDE SERPL-SCNC: 96 MMOL/L — SIGNIFICANT CHANGE UP (ref 96–108)
CO2 SERPL-SCNC: 26 MMOL/L — SIGNIFICANT CHANGE UP (ref 22–31)
COCAINE METAB.OTHER UR-MCNC: NEGATIVE — SIGNIFICANT CHANGE UP
COLOR SPEC: YELLOW — SIGNIFICANT CHANGE UP
CREAT SERPL-MCNC: 1.11 MG/DL — SIGNIFICANT CHANGE UP (ref 0.5–1.3)
DIFF PNL FLD: NEGATIVE — SIGNIFICANT CHANGE UP
GLUCOSE SERPL-MCNC: 112 MG/DL — HIGH (ref 70–99)
GLUCOSE UR QL: NEGATIVE — SIGNIFICANT CHANGE UP
HCT VFR BLD CALC: 43.2 % — SIGNIFICANT CHANGE UP (ref 39–50)
HGB BLD-MCNC: 15.3 G/DL — SIGNIFICANT CHANGE UP (ref 13–17)
KETONES UR-MCNC: NEGATIVE — SIGNIFICANT CHANGE UP
LEUKOCYTE ESTERASE UR-ACNC: NEGATIVE — SIGNIFICANT CHANGE UP
MAGNESIUM SERPL-MCNC: 2.1 MG/DL — SIGNIFICANT CHANGE UP (ref 1.6–2.6)
MCHC RBC-ENTMCNC: 30.1 PG — SIGNIFICANT CHANGE UP (ref 27–34)
MCHC RBC-ENTMCNC: 35.4 G/DL — SIGNIFICANT CHANGE UP (ref 32–36)
MCV RBC AUTO: 85 FL — SIGNIFICANT CHANGE UP (ref 80–100)
METHADONE UR-MCNC: NEGATIVE — SIGNIFICANT CHANGE UP
NITRITE UR-MCNC: NEGATIVE — SIGNIFICANT CHANGE UP
OPIATES UR-MCNC: NEGATIVE — SIGNIFICANT CHANGE UP
PCP SPEC-MCNC: SIGNIFICANT CHANGE UP
PCP UR-MCNC: NEGATIVE — SIGNIFICANT CHANGE UP
PH UR: 6.5 — SIGNIFICANT CHANGE UP (ref 5–8)
PLATELET # BLD AUTO: 181 K/UL — SIGNIFICANT CHANGE UP (ref 150–400)
POTASSIUM SERPL-MCNC: 3.6 MMOL/L — SIGNIFICANT CHANGE UP (ref 3.5–5.3)
POTASSIUM SERPL-SCNC: 3.6 MMOL/L — SIGNIFICANT CHANGE UP (ref 3.5–5.3)
PROT UR-MCNC: NEGATIVE MG/DL — SIGNIFICANT CHANGE UP
RBC # BLD: 5.08 M/UL — SIGNIFICANT CHANGE UP (ref 4.2–5.8)
RBC # FLD: 12 % — SIGNIFICANT CHANGE UP (ref 10.3–16.9)
SODIUM SERPL-SCNC: 134 MMOL/L — LOW (ref 135–145)
SP GR SPEC: <=1.005 — SIGNIFICANT CHANGE UP (ref 1–1.03)
THC UR QL: POSITIVE
UROBILINOGEN FLD QL: 0.2 E.U./DL — SIGNIFICANT CHANGE UP
WBC # BLD: 10.2 K/UL — SIGNIFICANT CHANGE UP (ref 3.8–10.5)
WBC # FLD AUTO: 10.2 K/UL — SIGNIFICANT CHANGE UP (ref 3.8–10.5)

## 2017-12-31 PROCEDURE — 81003 URINALYSIS AUTO W/O SCOPE: CPT

## 2017-12-31 PROCEDURE — 83690 ASSAY OF LIPASE: CPT

## 2017-12-31 PROCEDURE — 99239 HOSP IP/OBS DSCHRG MGMT >30: CPT

## 2017-12-31 PROCEDURE — 84100 ASSAY OF PHOSPHORUS: CPT

## 2017-12-31 PROCEDURE — 80307 DRUG TEST PRSMV CHEM ANLYZR: CPT

## 2017-12-31 PROCEDURE — 85730 THROMBOPLASTIN TIME PARTIAL: CPT

## 2017-12-31 PROCEDURE — 93005 ELECTROCARDIOGRAM TRACING: CPT

## 2017-12-31 PROCEDURE — 80048 BASIC METABOLIC PNL TOTAL CA: CPT

## 2017-12-31 PROCEDURE — 71046 X-RAY EXAM CHEST 2 VIEWS: CPT

## 2017-12-31 PROCEDURE — 96374 THER/PROPH/DIAG INJ IV PUSH: CPT

## 2017-12-31 PROCEDURE — 85027 COMPLETE CBC AUTOMATED: CPT

## 2017-12-31 PROCEDURE — 82803 BLOOD GASES ANY COMBINATION: CPT

## 2017-12-31 PROCEDURE — 76705 ECHO EXAM OF ABDOMEN: CPT

## 2017-12-31 PROCEDURE — 96376 TX/PRO/DX INJ SAME DRUG ADON: CPT

## 2017-12-31 PROCEDURE — 83036 HEMOGLOBIN GLYCOSYLATED A1C: CPT

## 2017-12-31 PROCEDURE — 85025 COMPLETE CBC W/AUTO DIFF WBC: CPT

## 2017-12-31 PROCEDURE — 81001 URINALYSIS AUTO W/SCOPE: CPT

## 2017-12-31 PROCEDURE — 74020: CPT

## 2017-12-31 PROCEDURE — 83930 ASSAY OF BLOOD OSMOLALITY: CPT

## 2017-12-31 PROCEDURE — 96375 TX/PRO/DX INJ NEW DRUG ADDON: CPT

## 2017-12-31 PROCEDURE — 82010 KETONE BODYS QUAN: CPT

## 2017-12-31 PROCEDURE — 83605 ASSAY OF LACTIC ACID: CPT

## 2017-12-31 PROCEDURE — 99285 EMERGENCY DEPT VISIT HI MDM: CPT | Mod: 25

## 2017-12-31 PROCEDURE — 85610 PROTHROMBIN TIME: CPT

## 2017-12-31 PROCEDURE — 80053 COMPREHEN METABOLIC PANEL: CPT

## 2017-12-31 PROCEDURE — 36415 COLL VENOUS BLD VENIPUNCTURE: CPT

## 2017-12-31 PROCEDURE — 83735 ASSAY OF MAGNESIUM: CPT

## 2017-12-31 RX ORDER — ONDANSETRON 8 MG/1
4 TABLET, FILM COATED ORAL EVERY 4 HOURS
Qty: 0 | Refills: 0 | Status: DISCONTINUED | OUTPATIENT
Start: 2017-12-31 | End: 2017-12-31

## 2017-12-31 RX ORDER — ONDANSETRON 8 MG/1
1 TABLET, FILM COATED ORAL
Qty: 15 | Refills: 0 | OUTPATIENT
Start: 2017-12-31 | End: 2018-01-04

## 2017-12-31 RX ADMIN — ONDANSETRON 4 MILLIGRAM(S): 8 TABLET, FILM COATED ORAL at 01:25

## 2017-12-31 RX ADMIN — ONDANSETRON 4 MILLIGRAM(S): 8 TABLET, FILM COATED ORAL at 05:48

## 2017-12-31 RX ADMIN — SODIUM CHLORIDE 120 MILLILITER(S): 9 INJECTION, SOLUTION INTRAVENOUS at 05:48

## 2017-12-31 RX ADMIN — ONDANSETRON 4 MILLIGRAM(S): 8 TABLET, FILM COATED ORAL at 08:49

## 2017-12-31 RX ADMIN — SODIUM CHLORIDE 120 MILLILITER(S): 9 INJECTION, SOLUTION INTRAVENOUS at 01:25

## 2017-12-31 RX ADMIN — PANTOPRAZOLE SODIUM 40 MILLIGRAM(S): 20 TABLET, DELAYED RELEASE ORAL at 11:47

## 2018-01-03 DIAGNOSIS — L65.9 NONSCARRING HAIR LOSS, UNSPECIFIED: ICD-10-CM

## 2018-01-03 DIAGNOSIS — R11.2 NAUSEA WITH VOMITING, UNSPECIFIED: ICD-10-CM

## 2018-01-03 DIAGNOSIS — E44.0 MODERATE PROTEIN-CALORIE MALNUTRITION: ICD-10-CM

## 2018-01-03 DIAGNOSIS — K58.9 IRRITABLE BOWEL SYNDROME WITHOUT DIARRHEA: ICD-10-CM

## 2018-01-03 DIAGNOSIS — E87.2 ACIDOSIS: ICD-10-CM

## 2018-01-03 DIAGNOSIS — F41.9 ANXIETY DISORDER, UNSPECIFIED: ICD-10-CM

## 2018-01-03 DIAGNOSIS — F12.988 CANNABIS USE, UNSPECIFIED WITH OTHER CANNABIS-INDUCED DISORDER: ICD-10-CM

## 2018-01-03 DIAGNOSIS — D72.829 ELEVATED WHITE BLOOD CELL COUNT, UNSPECIFIED: ICD-10-CM

## 2019-06-12 NOTE — BEHAVIORAL HEALTH ASSESSMENT NOTE - NSBHSUICPROTECTFACT_PSY_A_CORE
2-point gait Future oriented/Supportive social network or family/Responsibility to family and others/Ability to cope with stress/Positive therapeutic relationships/Identifies reasons for living

## 2019-08-01 NOTE — PROGRESS NOTE ADULT - NSHPATTENDINGPLANDISCUSS_GEN_ALL_CORE
Received a call with auth info:     - 200 units    AUTH# [id number]   valid 07/31/19 - 01/31/2020
house staff

## 2024-02-10 NOTE — BEHAVIORAL HEALTH ASSESSMENT NOTE - NS ED BHA AXIS I PRIMARY CODE FT
Patient resting in cart with even and unlabored respirations. No visible signs of distress noted. Sitter at bedside.    F12.188

## 2024-11-23 NOTE — PATIENT PROFILE ADULT. - ARE SIGNIFICANT INDICATORS COMPLETE.
You were seen in the emergency department over pain on urination.  As we discussed, you do have a urinary tract infection.  The treatment for this is antibiotics.  Ensure you take the antibiotics twice daily until the course is complete.  You were given the first dose here in the emergency department.  You were given a paper prescription for Macrobid [an antibiotic], you may take this to any pharmacy in the country.  You were also complaining of acid reflux.  You are being discharged on a course of Protonix, you must take this daily to help with the acidity.  You may also take over-the-counter calcium carbonate to help with the acidity.  Do not take both at the same time however.    Return to the emergency department if you have worsening pain, difficulty urinating, fever, or any other acute medical concern.  Schedule an appointment to be seen by the urologist as soon as possible for follow-up.    -----  Ou te wè nan depatman ijans flaco doulè nan pipi.  Kòm nou te diskite, ou gen yon enfeksyon nan aparèy urin.  Tretman emely sa a se antibyotik.  Asire w ou pran antibyotik yo de fwa pa jailene sungaske sarah a fini.  Ou te bay premye dòz la isit la nan depatman ijans la.  Yo te ba w yon preskripsyon papye emely Macrobid [yon antibyotik], ou ka pote sa a nan nenpòt famasi nan peyi a.  Ou t ap plenyen adi de rflu asid.  W ap egzeyate flaco yon sarah nan Protonix, ou dwe pran sa a chak jailene emely val ak asidite a.  Ou ka pran adi kabonat kalsyòm chen preskripsyon emely val ak asidite a.  Pa pran adi de an menm tan sepandan.    Retounen nan depatman ijans la si w gen doulè ki tracee pi grav, difikilte emely w pipi, lafyèv oswa nenpòt lòt pwoblèm medikal grav.  Planifye yon randevou emely w wè w pa iwològ la pi vit posib emely swivi.   Yes

## 2025-06-27 NOTE — ED PROVIDER NOTE - NS ED MD TWO NIGHTS YN
Take your antibiotics as directed. Do not stop taking them just because you feel better. You need to take the full course of antibiotics.  Drink extra water and other fluids for the next day or two. This will help make the urine less concentrated and help wash out the bacteria that are causing the infection. (If you have kidney, heart, or liver disease and have to limit fluids, talk with your doctor before you increase the amount of fluids you drink.)  Avoid drinks that are carbonated or have caffeine.   Urinate often. Try to empty your bladder each time.  If needed, take an over-the-counter pain medicine, such as acetaminophen (Tylenol), ibuprofen (Advil, Motrin), or naproxen (Aleve). Read and follow all instructions on the label, can take together for more severe pain or alternate every 4 hours.  To relieve pain, take a hot bath or lay a heating pad set on low over your lower belly or genital area. Never go to sleep with a heating pad in place.  To prevent UTIs  Drink plenty of water each day. This helps you urinate often, which clears bacteria from your system. (If you have kidney, heart, or liver disease and have to limit fluids, talk with your doctor before you increase the amount of fluids you drink.)  Urinate when you need to - avoid holding urine for long periods of time.  If you are sexually active, urinate right after you have sex.  Change sanitary pads often.  Avoid douches, bubble baths, feminine hygiene sprays, and other feminine hygiene products that have deodorants.  After you use the toilet, wipe from front to back.  Return to clinic if no improvement after 3 full days on antibiotic, new onset flank pain or fever.  Report to ER for high fever, severe flank or abdominal pain, new or significant worsening in symptoms.  Follow up with PCP in 5-7 days or for persistent or recurrent symptoms.     Yes